# Patient Record
Sex: FEMALE | Race: BLACK OR AFRICAN AMERICAN | Employment: FULL TIME | URBAN - METROPOLITAN AREA
[De-identification: names, ages, dates, MRNs, and addresses within clinical notes are randomized per-mention and may not be internally consistent; named-entity substitution may affect disease eponyms.]

---

## 2018-01-29 ENCOUNTER — HOSPITAL ENCOUNTER (EMERGENCY)
Age: 43
Discharge: HOME | End: 2018-01-29
Attending: EMERGENCY MEDICINE | Admitting: EMERGENCY MEDICINE
Payer: COMMERCIAL

## 2018-01-29 ENCOUNTER — APPOINTMENT (OUTPATIENT)
Dept: ULTRASOUND IMAGING | Age: 43
End: 2018-01-29
Attending: EMERGENCY MEDICINE
Payer: COMMERCIAL

## 2018-01-29 ENCOUNTER — TELEPHONE (OUTPATIENT)
Dept: INTERNAL MEDICINE CLINIC | Age: 43
End: 2018-01-29

## 2018-01-29 VITALS
TEMPERATURE: 98.4 F | WEIGHT: 162.7 LBS | RESPIRATION RATE: 18 BRPM | HEIGHT: 67 IN | SYSTOLIC BLOOD PRESSURE: 104 MMHG | DIASTOLIC BLOOD PRESSURE: 78 MMHG | HEART RATE: 73 BPM | BODY MASS INDEX: 25.54 KG/M2 | OXYGEN SATURATION: 100 %

## 2018-01-29 DIAGNOSIS — N92.0 MENORRHAGIA WITH REGULAR CYCLE: Primary | ICD-10-CM

## 2018-01-29 DIAGNOSIS — R79.89 ELEVATED TSH: ICD-10-CM

## 2018-01-29 DIAGNOSIS — D25.9 UTERINE LEIOMYOMA, UNSPECIFIED LOCATION: ICD-10-CM

## 2018-01-29 LAB
ANION GAP SERPL CALC-SCNC: 7 MMOL/L (ref 5–15)
APPEARANCE UR: CLEAR
BACTERIA URNS QL MICRO: NEGATIVE /HPF
BASOPHILS # BLD: 0 K/UL (ref 0–0.1)
BASOPHILS NFR BLD: 1 % (ref 0–1)
BILIRUB UR QL: NEGATIVE
BUN SERPL-MCNC: 11 MG/DL (ref 6–20)
BUN/CREAT SERPL: 11 (ref 12–20)
CALCIUM SERPL-MCNC: 8.8 MG/DL (ref 8.5–10.1)
CHLORIDE SERPL-SCNC: 104 MMOL/L (ref 97–108)
CO2 SERPL-SCNC: 28 MMOL/L (ref 21–32)
COLOR UR: ABNORMAL
CREAT SERPL-MCNC: 0.98 MG/DL (ref 0.55–1.02)
DIFFERENTIAL METHOD BLD: ABNORMAL
EOSINOPHIL # BLD: 0.1 K/UL (ref 0–0.4)
EOSINOPHIL NFR BLD: 3 % (ref 0–7)
EPITH CASTS URNS QL MICRO: ABNORMAL /LPF
ERYTHROCYTE [DISTWIDTH] IN BLOOD BY AUTOMATED COUNT: 12.6 % (ref 11.5–14.5)
GLUCOSE SERPL-MCNC: 100 MG/DL (ref 65–100)
GLUCOSE UR STRIP.AUTO-MCNC: NEGATIVE MG/DL
HCG UR QL: NEGATIVE
HCT VFR BLD AUTO: 36.9 % (ref 35–47)
HGB BLD-MCNC: 12.7 G/DL (ref 11.5–16)
HGB UR QL STRIP: ABNORMAL
IMM GRANULOCYTES # BLD: 0 K/UL (ref 0–0.04)
IMM GRANULOCYTES NFR BLD AUTO: 0 % (ref 0–0.5)
KETONES UR QL STRIP.AUTO: NEGATIVE MG/DL
LEUKOCYTE ESTERASE UR QL STRIP.AUTO: NEGATIVE
LYMPHOCYTES # BLD: 1.3 K/UL (ref 0.8–3.5)
LYMPHOCYTES NFR BLD: 41 % (ref 12–49)
MCH RBC QN AUTO: 30.1 PG (ref 26–34)
MCHC RBC AUTO-ENTMCNC: 34.4 G/DL (ref 30–36.5)
MCV RBC AUTO: 87.4 FL (ref 80–99)
MONOCYTES # BLD: 0.3 K/UL (ref 0–1)
MONOCYTES NFR BLD: 9 % (ref 5–13)
NEUTS SEG # BLD: 1.5 K/UL (ref 1.8–8)
NEUTS SEG NFR BLD: 46 % (ref 32–75)
NITRITE UR QL STRIP.AUTO: NEGATIVE
NRBC # BLD: 0 K/UL (ref 0–0.01)
NRBC BLD-RTO: 0 PER 100 WBC
PH UR STRIP: 5 [PH] (ref 5–8)
PLATELET # BLD AUTO: 243 K/UL (ref 150–400)
PMV BLD AUTO: 9.9 FL (ref 8.9–12.9)
POTASSIUM SERPL-SCNC: 3.2 MMOL/L (ref 3.5–5.1)
PROT UR STRIP-MCNC: NEGATIVE MG/DL
RBC # BLD AUTO: 4.22 M/UL (ref 3.8–5.2)
RBC #/AREA URNS HPF: ABNORMAL /HPF (ref 0–5)
SODIUM SERPL-SCNC: 139 MMOL/L (ref 136–145)
SP GR UR REFRACTOMETRY: 1.01 (ref 1–1.03)
T4 FREE SERPL-MCNC: 0.8 NG/DL (ref 0.8–1.5)
TSH SERPL DL<=0.05 MIU/L-ACNC: 14.7 UIU/ML (ref 0.36–3.74)
UA: UC IF INDICATED,UAUC: ABNORMAL
UROBILINOGEN UR QL STRIP.AUTO: 0.2 EU/DL (ref 0.2–1)
WBC # BLD AUTO: 3.3 K/UL (ref 3.6–11)
WBC URNS QL MICRO: ABNORMAL /HPF (ref 0–4)

## 2018-01-29 PROCEDURE — 85025 COMPLETE CBC W/AUTO DIFF WBC: CPT | Performed by: EMERGENCY MEDICINE

## 2018-01-29 PROCEDURE — 84439 ASSAY OF FREE THYROXINE: CPT | Performed by: EMERGENCY MEDICINE

## 2018-01-29 PROCEDURE — 99284 EMERGENCY DEPT VISIT MOD MDM: CPT

## 2018-01-29 PROCEDURE — 81001 URINALYSIS AUTO W/SCOPE: CPT | Performed by: EMERGENCY MEDICINE

## 2018-01-29 PROCEDURE — 84443 ASSAY THYROID STIM HORMONE: CPT | Performed by: EMERGENCY MEDICINE

## 2018-01-29 PROCEDURE — 80048 BASIC METABOLIC PNL TOTAL CA: CPT | Performed by: EMERGENCY MEDICINE

## 2018-01-29 PROCEDURE — 76830 TRANSVAGINAL US NON-OB: CPT

## 2018-01-29 PROCEDURE — 81025 URINE PREGNANCY TEST: CPT | Performed by: EMERGENCY MEDICINE

## 2018-01-29 PROCEDURE — 36415 COLL VENOUS BLD VENIPUNCTURE: CPT | Performed by: EMERGENCY MEDICINE

## 2018-01-29 RX ORDER — MEDROXYPROGESTERONE ACETATE 10 MG/1
10 TABLET ORAL DAILY
Qty: 10 TAB | Refills: 0 | Status: SHIPPED | OUTPATIENT
Start: 2018-01-29 | End: 2018-09-20

## 2018-01-29 NOTE — ED NOTES
0830- Assumed care of patient. Patient is alert and oriented, does not appear to be in distress. Patient ambulatory to ED with c/o vaginal bleeding; hx of fibroids and multiple cysts. VSS.

## 2018-01-29 NOTE — ED PROVIDER NOTES
EMERGENCY DEPARTMENT HISTORY AND PHYSICAL EXAM      Date: 1/29/2018  Patient Name: Shiv Larson    History of Presenting Illness     Chief Complaint   Patient presents with    Vaginal Bleeding     Pt. complains of vaginal bleeding x2 weeks. Pt. 1 pad every 3 hours and today has an odor. History Provided By: Patient    HPI: Shiv Larson, 36 y.o. female with PMHx significant for HTN, anemia, asthma, thyroid disease, and HPV, presents ambulatory to the ED with cc of vaginal bleeding x ~2 weeks. She states she was going though 1 pad every ~2 hours with initial onset, but states her bleeding has since improved, though it had become slightly malodorous in the past few days. Pt states she has hx of irregular menstrual periods, but reports LMP approximately ~28 days ago. She notes hx of uterine fibroids, as well as hx of Bartholin's cyst removal. Pt denies currently taking anticoagulants. She denies currently being followed by OB/Gyn, noting her specialist recently changed location. Pt denies concern for STD exposure. Pt specifically denies fever, chills, cough, or ABD pain. PCP: Louise Garcia NP    There are no other complaints, changes, or physical findings at this time. Current Outpatient Prescriptions   Medication Sig Dispense Refill    HYDROcodone-acetaminophen (NORCO) 5-325 mg per tablet Take 1 Tab by mouth every four (4) hours as needed for Pain. Max Daily Amount: 6 Tabs. 12 Tab 0    ondansetron (ZOFRAN ODT) 4 mg disintegrating tablet Take 0.5-1 tablets by mouth every eight (8) hours as needed for Nausea. 20 tablet 1    L-Norgest&E Estradiol-E Estrad (LOSEASONIQUE) 0.10 mg-20 mcg (84)/10 mcg (7) 3MPk Take 1 Tab by mouth daily. 1 Package 3    multivitamin (ONE A DAY) tablet Take 1 Tab by mouth daily.          Past History     Past Medical History:  Past Medical History:   Diagnosis Date    Anemia NEC     Asthma     HPV in female     Hypertension     Thyroid disease        Past Surgical History:  Past Surgical History:   Procedure Laterality Date    HX GYN  2/18/14    HYSTEROSCOPY /DILATATION AND CURETTAGE, BARTHOLIN MARSUPILIZATION    HX TUBAL LIGATION         Family History:  Family History   Problem Relation Age of Onset    Diabetes Father     Stroke Father     High Cholesterol Father     Kidney Disease Father     Alcohol abuse Father     Anemia Father     Cancer Maternal Grandmother     Other Mother      clot to leg    SLE Brother     Asthma Son        Social History:  Social History   Substance Use Topics    Smoking status: Never Smoker    Smokeless tobacco: Not on file    Alcohol use No       Allergies: Allergies   Allergen Reactions    Shellfish Derived Hives         Review of Systems   Review of Systems   Constitutional: Negative for chills and fever. HENT: Negative for congestion and sore throat. Eyes: Negative for visual disturbance. Respiratory: Negative for cough and shortness of breath. Cardiovascular: Negative for chest pain and leg swelling. Gastrointestinal: Negative for abdominal pain, blood in stool, diarrhea and nausea. Endocrine: Negative for polyuria. Genitourinary: Positive for vaginal bleeding. Negative for dysuria, flank pain and vaginal discharge. Musculoskeletal: Negative for myalgias. Skin: Negative for rash. Allergic/Immunologic: Negative for immunocompromised state. Neurological: Negative for weakness and headaches. Psychiatric/Behavioral: Negative for confusion. Physical Exam   Physical Exam   Constitutional: She is oriented to person, place, and time. She appears well-developed and well-nourished. HENT:   Head: Normocephalic and atraumatic. Moist mucous membranes   Eyes: Conjunctivae are normal. Pupils are equal, round, and reactive to light. Right eye exhibits no discharge. Left eye exhibits no discharge. Neck: Normal range of motion. Neck supple. No tracheal deviation present.    Cardiovascular: Normal rate, regular rhythm and normal heart sounds. No murmur heard. Pulmonary/Chest: Effort normal and breath sounds normal. No respiratory distress. She has no wheezes. She has no rales. Abdominal: Soft. Bowel sounds are normal. There is no tenderness. There is no rebound and no guarding. Musculoskeletal: Normal range of motion. She exhibits no edema, tenderness or deformity. Neurological: She is alert and oriented to person, place, and time. Skin: Skin is warm and dry. No rash noted. No erythema. Psychiatric: Her behavior is normal.   Nursing note and vitals reviewed.         Diagnostic Study Results     Labs -     Recent Results (from the past 12 hour(s))   URINALYSIS W/ REFLEX CULTURE    Collection Time: 01/29/18  8:42 AM   Result Value Ref Range    Color YELLOW/STRAW      Appearance CLEAR CLEAR      Specific gravity 1.013 1.003 - 1.030      pH (UA) 5.0 5.0 - 8.0      Protein NEGATIVE  NEG mg/dL    Glucose NEGATIVE  NEG mg/dL    Ketone NEGATIVE  NEG mg/dL    Bilirubin NEGATIVE  NEG      Blood LARGE (A) NEG      Urobilinogen 0.2 0.2 - 1.0 EU/dL    Nitrites NEGATIVE  NEG      Leukocyte Esterase NEGATIVE  NEG      WBC 0-4 0 - 4 /hpf    RBC 10-20 0 - 5 /hpf    Epithelial cells FEW FEW /lpf    Bacteria NEGATIVE  NEG /hpf    UA:UC IF INDICATED CULTURE NOT INDICATED BY UA RESULT CNI     HCG URINE, QL    Collection Time: 01/29/18  8:42 AM   Result Value Ref Range    HCG urine, QL NEGATIVE  NEG     CBC WITH AUTOMATED DIFF    Collection Time: 01/29/18  8:42 AM   Result Value Ref Range    WBC 3.3 (L) 3.6 - 11.0 K/uL    RBC 4.22 3.80 - 5.20 M/uL    HGB 12.7 11.5 - 16.0 g/dL    HCT 36.9 35.0 - 47.0 %    MCV 87.4 80.0 - 99.0 FL    MCH 30.1 26.0 - 34.0 PG    MCHC 34.4 30.0 - 36.5 g/dL    RDW 12.6 11.5 - 14.5 %    PLATELET 725 749 - 599 K/uL    MPV 9.9 8.9 - 12.9 FL    NRBC 0.0 0  WBC    ABSOLUTE NRBC 0.00 0.00 - 0.01 K/uL    NEUTROPHILS 46 32 - 75 %    LYMPHOCYTES 41 12 - 49 %    MONOCYTES 9 5 - 13 %    EOSINOPHILS 3 0 - 7 %    BASOPHILS 1 0 - 1 %    IMMATURE GRANULOCYTES 0 0.0 - 0.5 %    ABS. NEUTROPHILS 1.5 (L) 1.8 - 8.0 K/UL    ABS. LYMPHOCYTES 1.3 0.8 - 3.5 K/UL    ABS. MONOCYTES 0.3 0.0 - 1.0 K/UL    ABS. EOSINOPHILS 0.1 0.0 - 0.4 K/UL    ABS. BASOPHILS 0.0 0.0 - 0.1 K/UL    ABS. IMM. GRANS. 0.0 0.00 - 0.04 K/UL    DF AUTOMATED     METABOLIC PANEL, BASIC    Collection Time: 01/29/18  8:42 AM   Result Value Ref Range    Sodium 139 136 - 145 mmol/L    Potassium 3.2 (L) 3.5 - 5.1 mmol/L    Chloride 104 97 - 108 mmol/L    CO2 28 21 - 32 mmol/L    Anion gap 7 5 - 15 mmol/L    Glucose 100 65 - 100 mg/dL    BUN 11 6 - 20 MG/DL    Creatinine 0.98 0.55 - 1.02 MG/DL    BUN/Creatinine ratio 11 (L) 12 - 20      GFR est AA >60 >60 ml/min/1.73m2    GFR est non-AA >60 >60 ml/min/1.73m2    Calcium 8.8 8.5 - 10.1 MG/DL   TSH 3RD GENERATION    Collection Time: 01/29/18  8:42 AM   Result Value Ref Range    TSH 14.70 (H) 0.36 - 3.74 uIU/mL       Radiologic Studies -   US TRANSVAGINAL   Final Result   CLINICAL HISTORY: Abnormal vaginal bleeding     Realtime sonographic imaging of the pelvis was performed transvaginally. The  uterus measures 9.6 x 5.4 x 6.1 cm and has multiple fibroids. There is a 2.3 cm  fibroid in the posterior uterine body and a 2.3 cm fibroid in the anterior  uterine body. There is suggestion of adenomyosis. The endometrial stripe  measures 7 mm. The right ovary measures 3.4 x 2.8 x 2.3 cm and the left ovary  measures 2.8 x 2.7 x 1.3 cm. Small follicular cysts are seen in both ovaries,  the largest of which measures 1.3 cm. Blood flow is documented within both  ovaries. No free fluid.      IMPRESSION  IMPRESSION: Multiple fibroids. Suggestion of adenomyosis. Medical Decision Making   I am the first provider for this patient. I reviewed the vital signs, available nursing notes, past medical history, past surgical history, family history and social history. Vital Signs-Reviewed the patient's vital signs.   Patient Vitals for the past 12 hrs:   Temp Pulse Resp BP SpO2   01/29/18 0802 98.4 °F (36.9 °C) 89 14 (!) 136/92 100 %       Pulse Oximetry Analysis - 100% on RA    Records Reviewed: Nursing Notes and Old Medical Records    Provider Notes (Medical Decision Making):     DDx: dysfunctional uterine bleeding, hypothyroidism, fibroids, mass, malignancy    ED Course:   Initial assessment performed. The patients presenting problems have been discussed, and they are in agreement with the care plan formulated and outlined with them. I have encouraged them to ask questions as they arise throughout their visit. Consult Note:  11:03 AM  Toi Mullins DO spoke with Amelia Salazar MD  Specialty: OB/Gyn  Discussed pts hx, disposition, and available diagnostic and imaging results. Reviewed care plans. Consultant agrees with plans as outlined. He recommends Provera or Sprintec. Consult Note:  11:21 AM  Toi Mullins DO spoke with Gretta Estevez NP  Specialty: PCP  Discussed pts hx, disposition, and available diagnostic and imaging results. Reviewed care plans. Consultant agrees with plans as outlined. She will see pt in office. Follow up for possible hypothyroidism. Disposition:  DISCHARGE NOTE:  11:29 AM  The patient is ready for discharge. The patients signs, symptoms, diagnosis, and instructions for discharge have been discussed and the pt has conveyed their understanding. The patient is to follow up as recommended or return to the ER should their symptoms worsen. Plan has been discussed and patient has conveyed their agreement. PLAN:  1. Current Discharge Medication List      START taking these medications    Details   medroxyPROGESTERone (PROVERA) 10 mg tablet Take 1 Tab by mouth daily. Qty: 10 Tab, Refills: 0           2.    Follow-up Information     Follow up With Details Comments Contact Info    Gretta Estevez NP Call in 1 day  [address] and Internal Medicine  [facility] [address] [facility]  [phone]      [facility] EMERGENCY DEPT   [address] [place] [address] Road  [phone] Tariq Ray . [phone]          Return to ED if worse     Diagnosis     Clinical Impression:   1. Menorrhagia with regular cycle    2. Uterine leiomyoma, unspecified location    3. Elevated TSH        Attestations: This note is prepared by Erwin Aragon, acting as Scribe for [employer] DO. [employer], DO: The scribe's documentation has been prepared under my direction and personally reviewed by me in its entirety. I confirm that the note above accurately reflects all work, treatment, procedures, and medical decision making performed by me.

## 2018-01-29 NOTE — DISCHARGE INSTRUCTIONS
Abnormal Uterine Bleeding: Care Instructions  Your Care Instructions    Abnormal uterine bleeding (AUB) is irregular bleeding from the uterus that is longer or heavier than usual or does not occur at your regular time. Sometimes it is caused by changes in hormone levels. It can also be caused by growths in the uterus, such as fibroids or polyps. Sometimes a cause cannot be found. You may have heavy bleeding when you are not expecting your period. Your doctor may suggest a pregnancy test, if you think you are pregnant. Follow-up care is a key part of your treatment and safety. Be sure to make and go to all appointments, and call your doctor if you are having problems. It's also a good idea to know your test results and keep a list of the medicines you take. How can you care for yourself at home? · Be safe with medicines. Take pain medicines exactly as directed. ¨ If the doctor gave you a prescription medicine for pain, take it as prescribed. ¨ If you are not taking a prescription pain medicine, ask your doctor if you can take an over-the-counter medicine. · You may be low in iron because of blood loss. Eat a balanced diet that is high in iron and vitamin C. Foods rich in iron include red meat, shellfish, eggs, beans, and leafy green vegetables. Talk to your doctor about whether you need to take iron pills or a multivitamin. When should you call for help? Call 911 anytime you think you may need emergency care. For example, call if:  ? · You passed out (lost consciousness). ?Call your doctor now or seek immediate medical care if:  ? · You have new or worse belly or pelvic pain. ? · You have severe vaginal bleeding. ? · You feel dizzy or lightheaded, or you feel like you may faint. ? Watch closely for changes in your health, and be sure to contact your doctor if:  ? · You think you may be pregnant. ? · Your bleeding gets worse. ? · You do not get better as expected.    Where can you learn more?  Go to http://mercedes-jl.info/. Enter X457 in the search box to learn more about \"Abnormal Uterine Bleeding: Care Instructions. \"  Current as of: October 13, 2016  Content Version: 11.4  © 7682-0389 [employer]. Care instructions adapted under license by [employer] (which disclaims liability or warranty for this information). If you have questions about a medical condition or this instruction, always ask your healthcare professional. [address] any warranty or liability for your use of this information. Uterine Fibroids: Care Instructions  Your Care Instructions    Uterine fibroids are growths in the uterus. Fibroids aren't cancer. Doctors don't know what causes fibroids. Fibroids are very common in women during their childbearing years. Fibroids can grow on the inside of the uterus, in the muscle wall of the uterus, or near the outside wall of the uterus. In some women, fibroids cause painful cramps and heavy periods. In these cases, taking anti-inflammatory medicines, birth control pills, or using an intrauterine device (IUD) often helps decrease symptoms. Sometimes surgery is needed to treat fibroids. But if you are near menopause, you may want to wait and see if your symptoms get better. Most fibroids shrink and go away after menopause, when your menstrual periods stop completely. Follow-up care is a key part of your treatment and safety. Be sure to make and go to all appointments, and call your doctor if you are having problems. It's also a good idea to know your test results and keep a list of the medicines you take. How can you care for yourself at home? · If your doctor gave you medicine, take it as exactly as prescribed. Be safe with medicines. Call your doctor if you think you are having a problem with your medicine. · Take anti-inflammatory medicines for pain.  These include ibuprofen (Advil, Motrin) and naproxen (Ishan). Read and follow all instructions on the label. · Use heat, such as a hot water bottle or a heating pad set on low, or a warm bath to relax tense muscles and relieve cramping. Put a thin cloth between the heating pad and your skin. Never go to sleep with a heating pad on. · Lie down and put a pillow under your knees. Or, lie on your side and bring your knees up to your chest. These positions may help relieve belly pain or pressure. · Keep track of how many sanitary pads or tampons you use each day. · Get at least 30 minutes of exercise on most days of the week. Walking is a good choice. You also may want to do other activities, such as running, swimming, cycling, or playing tennis or team sports. · If you bleed longer than usual or have heavy bleeding, take a daily multivitamin with iron. When should you call for help? Call your doctor now or seek immediate medical care if:  ? · You have severe vaginal bleeding. ? · You have new or worse belly or pelvic pain. ? Watch closely for changes in your health, and be sure to contact your doctor if:  ? · You have unusual vaginal bleeding. ? · You do not get better as expected. Where can you learn more? Go to http://mercedes-jl.info/. Enter B121 in the search box to learn more about \"Uterine Fibroids: Care Instructions. \"  Current as of: October 13, 2016  Content Version: 11.4  © 3138-0479 [employer]. Care instructions adapted under license by [employer] (which disclaims liability or warranty for this information). If you have questions about a medical condition or this instruction, always ask your healthcare professional. [place] [address] any warranty or liability for your use of this information.

## 2018-01-30 ENCOUNTER — OFFICE VISIT (OUTPATIENT)
Dept: INTERNAL MEDICINE CLINIC | Age: 43
End: 2018-01-30

## 2018-01-30 VITALS
SYSTOLIC BLOOD PRESSURE: 114 MMHG | WEIGHT: 162.4 LBS | TEMPERATURE: 98.6 F | RESPIRATION RATE: 18 BRPM | BODY MASS INDEX: 25.49 KG/M2 | HEIGHT: 67 IN | OXYGEN SATURATION: 100 % | HEART RATE: 64 BPM | DIASTOLIC BLOOD PRESSURE: 63 MMHG

## 2018-01-30 DIAGNOSIS — E87.6 HYPOKALEMIA: ICD-10-CM

## 2018-01-30 DIAGNOSIS — E89.0 POSTABLATIVE HYPOTHYROIDISM: Primary | ICD-10-CM

## 2018-01-30 DIAGNOSIS — N92.1 MENORRHAGIA WITH IRREGULAR CYCLE: ICD-10-CM

## 2018-01-30 RX ORDER — DIAPER,BRIEF,INFANT-TODD,DISP
EACH MISCELLANEOUS
COMMUNITY
Start: 2018-01-30 | End: 2018-09-20

## 2018-01-30 RX ORDER — POTASSIUM CHLORIDE 750 MG/1
10 TABLET, EXTENDED RELEASE ORAL 2 TIMES DAILY
Qty: 10 TAB | Refills: 0 | Status: SHIPPED | OUTPATIENT
Start: 2018-01-30 | End: 2018-02-04

## 2018-01-30 RX ORDER — LEVOTHYROXINE SODIUM 50 UG/1
50 TABLET ORAL
Qty: 30 TAB | Refills: 3 | Status: SHIPPED | OUTPATIENT
Start: 2018-01-30 | End: 2018-09-24 | Stop reason: DRUGHIGH

## 2018-01-30 NOTE — PROGRESS NOTES
HISTORY OF PRESENT ILLNESS  Katarina Montenegro is a 36 y.o. female presents for [facility]     Patient seen by ED provider 1/29 with heavy vaginal bleed for 2 weeks. Started on [address]. Bleeding significantly improved first day on medication  Ultrasound showed multiple uterine fibroid. She will schedule appointment with gynecologist at [facility]. Hx of menorrhagia, hysteroscopy and D&C  TSH elevated on ED visit. She reports hx of thyroid disorder complicated by thyroid storm and graves disease. No medical management for sometime with normal thyroid function. Past Medical History:   Diagnosis Date    Anemia NEC     Asthma     HPV in female     Hypertension     Thyroid disease      Past Surgical History:   Procedure Laterality Date    HX GYN  2/18/14    HYSTEROSCOPY /DILATATION AND CURETTAGE, BARTHOLIN MARSUPILIZATION    HX TUBAL LIGATION           Review of Systems   Constitutional: Positive for malaise/fatigue. Eyes: Negative. Respiratory: Negative. Cardiovascular: Negative. Genitourinary: Negative. Neurological: Negative for dizziness and headaches. Psychiatric/Behavioral: Negative. Physical Exam    ASSESSMENT and PLAN    ICD-10-CM ICD-9-CM    1. Postablative hypothyroidism E89.0 244.1 levothyroxine (SYNTHROID) 50 mcg tablet   2. Menorrhagia with irregular cycle N92.1 626.2    3. Hypokalemia E87.6 276.8 MAGNESIUM      potassium chloride (KLOR-CON) 10 mEq tablet      POTASSIUM     Follow-up Disposition:  Return in about 2 months (around 3/30/2018) for thyroid disorder. lab results and schedule of future lab studies reviewed with patient  reviewed medications and side effects in detail    Restart medication. 2. Strongly encouraged to complete ED medication, schedule appointment with gynecologist     3. Etiology unknown.  Encouraged  to increase dietary potassium    Patient voices understanding and acceptance of this advice and will call back if any further questions or concerns. An After Visit Summary was printed and given to the patient.

## 2018-01-30 NOTE — MR AVS SNAPSHOT
[address]  [address] [place] [address] 
[phone] Patient: Sabino Alonso MRN: [id number] Visit Information Date & Time Provider Department Dept. Phone Encounter #  
 1/30/2018  1:45 PM Kendra Wills [phone] [phone] [id number] Follow-up Instructions Return in about 2 months (around 3/30/2018) for thyroid disorder. Upcoming Health Maintenance Date Due DTaP/Tdap/Td series (1 - Tdap) 8/7/2014 PAP AKA CERVICAL CYTOLOGY 5/7/2017 Allergies as of 1/30/2018  Review Complete On: 1/30/2018 By: Daniella Bartlett NP Severity Noted Reaction Type Reactions Shellfish Derived  08/06/2014    Hives Current Immunizations  Reviewed on 8/3/2015 Name Date Influenza Vaccine (Quad) PF 1/5/2015 Td, Adsorbed PF 8/6/2014  7:33 AM  
  
 Not reviewed this visit You Were Diagnosed With   
  
 Codes Comments Postablative hypothyroidism    -  Primary ICD-10-CM: E89.0 ICD-9-CM: 244.1 Menorrhagia with irregular cycle     ICD-10-CM: N92.1 ICD-9-CM: 626.2 Hypokalemia     ICD-10-CM: E87.6 ICD-9-CM: 276.8 Vitals BP Pulse Temp Resp Height(growth percentile) Weight(growth percentile) 114/63 (BP 1 Location: Right arm, BP Patient Position: Sitting) 64 98.6 °F (37 °C) (Oral) 18 5' 7.01\" (1.702 m) 162 lb 6.4 oz (73.7 kg) LMP SpO2 BMI OB Status Smoking Status 01/15/2018 100% 25.43 kg/m2 Chemically Induced Never Smoker BMI and BSA Data Body Mass Index Body Surface Area  
 25.43 kg/m 2 1.87 m 2 Preferred Pharmacy Pharmacy Name Phone [facility] [address] [facility] [phone] Your Updated Medication List  
  
   
This list is accurate as of: 1/30/18  2:50 PM.  Always use your most recent med list.  
  
  
  
  
 biotin 10,000 mcg Cap Take  by mouth.  
  
 levothyroxine 50 mcg tablet Commonly known as:  SYNTHROID Take 1 Tab by mouth Daily (before breakfast). Indications: hypothyroidism  
  
 medroxyPROGESTERone 10 mg tablet Commonly known as:  PROVERA Take 1 Tab by mouth daily. multivitamin tablet Commonly known as:  ONE A DAY Take 1 Tab by mouth daily. potassium chloride 10 mEq tablet Commonly known as:  KLOR-CON Take 1 Tab by mouth two (2) times a day for 5 days. Prescriptions Sent to Pharmacy Refills  
 levothyroxine (SYNTHROID) 50 mcg tablet 3 Sig: Take 1 Tab by mouth Daily (before breakfast). Indications: hypothyroidism Class: Normal  
 Pharmacy: [address] [place] [address] Ph #: [phone] Route: Oral  
 potassium chloride (KLOR-CON) 10 mEq tablet 0 Sig: Take 1 Tab by mouth two (2) times a day for 5 days. Class: Normal  
 Pharmacy: [address] [place] [address] Ph #: [phone] Route: Oral  
  
We Performed the Following MAGNESIUM B480822 CPT(R)] POTASSIUM [id number] CPT(R)] Follow-up Instructions Return in about 2 months (around 3/30/2018) for thyroid disorder. Patient Instructions Hypokalemia: Care Instructions Your Care Instructions Hypokalemia (say \"kp-dw-yhp-KAYLEN-inocencia-uh\") is a low level of potassium. The heart, muscles, kidneys, and nervous system all need potassium to work well. This problem has many different causes. Kidney problems, diet, and medicines like diuretics and laxatives can cause it. So can vomiting or diarrhea. In some cases, cancer is the cause. Your doctor may do tests to find the cause of your low potassium levels. You may need medicines to bring your potassium levels back to normal. You may also need regular blood tests to check your potassium. If you have very low potassium, you may need intravenous (IV) medicines. You also may need tests to check the electrical activity of your heart. Heart problems caused by low potassium levels can be very serious. Follow-up care is a key part of your treatment and safety. Be sure to make and go to all appointments, and call your doctor if you are having problems. It's also a good idea to know your test results and keep a list of the medicines you take. How can you care for yourself at home? · If your doctor recommends it, eat foods that have a lot of potassium. These include fresh fruits, juices, and vegetables. They also include nuts, beans, and milk. · Be safe with medicines. If your doctor prescribes medicines or potassium supplements, take them exactly as directed. Call your doctor if you have any problems with your medicines. · Get your potassium levels tested as often as your doctor tells you. When should you call for help? Call 911 anytime you think you may need emergency care. For example, call if: 
? · You feel like your heart is missing beats. Heart problems caused by low potassium can cause death. ? · You passed out (lost consciousness). ? · You have a seizure. ?Call your doctor now or seek immediate medical care if: 
? · You feel weak or unusually tired. ? · You have severe arm or leg cramps. ? · You have tingling or numbness. ? · You feel sick to your stomach, or you vomit. ? · You have belly cramps. ? · You feel bloated or constipated. ? · You have to urinate a lot. ? · You feel very thirsty most of the time. ? · You are dizzy or lightheaded, or you feel like you may faint. ? · You feel depressed, or you lose touch with reality. ? Watch closely for changes in your health, and be sure to contact your doctor if: 
? · You do not get better as expected. Where can you learn more? Go to http://mercedes-jl.info/. Enter G358 in the search box to learn more about \"Hypokalemia: Care Instructions. \" Current as of: May 12, 2017 Content Version: 11.4 © 5144-9122 [employer] Care instructions adapted under license by [employer] (which disclaims liability or warranty for this information). If you have questions about a medical condition or this instruction, always ask your healthcare professional. [address] any warranty or liability for your use of this information. Hypothyroidism: Care Instructions Your Care Instructions You have hypothyroidism, which means that your body is not making enough thyroid hormone. This hormone helps your body use energy. If your thyroid level is low, you may feel tired, be constipated, have an increase in your blood pressure, or have dry skin or memory problems. You may also get cold easily, even when it is warm. Women with low thyroid levels may have heavy menstrual periods. A blood test to find your thyroid-stimulating hormone (TSH) level is used to check for hypothyroidism. A high TSH level may mean that you have low thyroid. When your body is not making enough thyroid hormone, TSH levels rise in an effort to make the body produce more. The treatment for hypothyroidism is to take thyroid hormone pills. You should start to feel better in 1 to 2 weeks. But it can take several months to see changes in the TSH level. You will need regular visits with your doctor to make sure you have the right dose of medicine. Most people need treatment for the rest of their lives. You will need to see your doctor regularly to have blood tests and to make sure you are doing well. Follow-up care is a key part of your treatment and safety. Be sure to make and go to all appointments, and call your doctor if you are having problems. It's also a good idea to know your test results and keep a list of the medicines you take. How can you care for yourself at home? · Take your thyroid hormone medicine exactly as prescribed.  Call your doctor if you think you are having a problem with your medicine. Most people do not have side effects if they take the right amount of medicine regularly. ¨ Take the medicine 30 minutes before breakfast, and do not take it with calcium, vitamins, or iron. ¨ Do not take extra doses of your thyroid medicine. It will not help you get better any faster, and it may cause side effects. ¨ If you forget to take a dose, do NOT take a double dose of medicine. Take your usual dose the next day. · Tell your doctor about all prescription, herbal, or over-the-counter products you take. · Take care of yourself. Eat a healthy diet, get enough sleep, and get regular exercise. When should you call for help? Call 911 anytime you think you may need emergency care. For example, call if: 
? · You passed out (lost consciousness). ? · You have severe trouble breathing. ? · You have a very slow heartbeat (less than 60 beats a minute). ? · You have a low body temperature (95°F or below). ?Call your doctor now or seek immediate medical care if: 
? · You feel tired, sluggish, or weak. ? · You have trouble remembering things or concentrating. ? · You do not begin to feel better 2 weeks after starting your medicine. ? Watch closely for changes in your health, and be sure to contact your doctor if you have any problems. Where can you learn more? Go to http://mercedes-jl.info/. Enter E456 in the search box to learn more about \"Hypothyroidism: Care Instructions. \" Current as of: May 12, 2017 Content Version: 11.4 © 4760-4010 [employer]. Care instructions adapted under license by [employer] (which disclaims liability or warranty for this information). If you have questions about a medical condition or this instruction, always ask your healthcare professional. [address] any warranty or liability for your use of this information. Introducing [facility]! [facility] introduces Ph.Creative patient portal. Now you can access parts of your medical record, email your doctor's office, and request medication refills online. 1. In your internet browser, go to [email or web address] 2. Click on the First Time User? Click Here link in the Sign In box. You will see the New Member Sign Up page. 3. Enter your [employer] Access Code exactly as it appears below. You will not need to use this code after youve completed the sign-up process. If you do not sign up before the expiration date, you must request a new code. · Ph.Creative Access Code: [facility] Expires: 4/29/2018 11:30 AM 
 
4. Enter the last four digits of your Social Security Number (xxxx) and Date of Birth (mm/dd/yyyy) as indicated and click Submit. You will be taken to the next sign-up page. 5. Create a Ph.Creative ID. This will be your Ph.Creative login ID and cannot be changed, so think of one that is secure and easy to remember. 6. Create a Ph.Creative password. You can change your password at any time. 7. Enter your Password Reset Question and Answer. This can be used at a later time if you forget your password. 8. Enter your e-mail address. You will receive e-mail notification when new information is available in [address] 9. Click Sign Up. You can now view and download portions of your medical record. 10. Click the Download Summary menu link to download a portable copy of your medical information. If you have questions, please visit the Frequently Asked Questions section of the Ph.Creative website. Remember, Ph.Creative is NOT to be used for urgent needs. For medical emergencies, dial 911. Now available from your iPhone and Android! Please provide this summary of care documentation to your next provider. Your primary care clinician is listed as Bettey Lanes. If you have any questions after today's visit, please call [phone].

## 2018-01-30 NOTE — PATIENT INSTRUCTIONS
Hypokalemia: Care Instructions  Your Care Instructions    Hypokalemia (say \"fn-lb-ntm-KAYLEN-inocencia-uh\") is a low level of potassium. The heart, muscles, kidneys, and nervous system all need potassium to work well. This problem has many different causes. Kidney problems, diet, and medicines like diuretics and laxatives can cause it. So can vomiting or diarrhea. In some cases, cancer is the cause. Your doctor may do tests to find the cause of your low potassium levels. You may need medicines to bring your potassium levels back to normal. You may also need regular blood tests to check your potassium. If you have very low potassium, you may need intravenous (IV) medicines. You also may need tests to check the electrical activity of your heart. Heart problems caused by low potassium levels can be very serious. Follow-up care is a key part of your treatment and safety. Be sure to make and go to all appointments, and call your doctor if you are having problems. It's also a good idea to know your test results and keep a list of the medicines you take. How can you care for yourself at home? · If your doctor recommends it, eat foods that have a lot of potassium. These include fresh fruits, juices, and vegetables. They also include nuts, beans, and milk. · Be safe with medicines. If your doctor prescribes medicines or potassium supplements, take them exactly as directed. Call your doctor if you have any problems with your medicines. · Get your potassium levels tested as often as your doctor tells you. When should you call for help? Call 911 anytime you think you may need emergency care. For example, call if:  ? · You feel like your heart is missing beats. Heart problems caused by low potassium can cause death. ? · You passed out (lost consciousness). ? · You have a seizure. ?Call your doctor now or seek immediate medical care if:  ? · You feel weak or unusually tired. ? · You have severe arm or leg cramps. ? · You have tingling or numbness. ? · You feel sick to your stomach, or you vomit. ? · You have belly cramps. ? · You feel bloated or constipated. ? · You have to urinate a lot. ? · You feel very thirsty most of the time. ? · You are dizzy or lightheaded, or you feel like you may faint. ? · You feel depressed, or you lose touch with reality. ? Watch closely for changes in your health, and be sure to contact your doctor if:  ? · You do not get better as expected. Where can you learn more? Go to http://mercedes-jl.info/. Enter G358 in the search box to learn more about \"Hypokalemia: Care Instructions. \"  Current as of: May 12, 2017  Content Version: 11.4  © 4987-9502 [employer]. Care instructions adapted under license by [employer] (which disclaims liability or warranty for this information). If you have questions about a medical condition or this instruction, always ask your healthcare professional. [address] any warranty or liability for your use of this information. Hypothyroidism: Care Instructions  Your Care Instructions    You have hypothyroidism, which means that your body is not making enough thyroid hormone. This hormone helps your body use energy. If your thyroid level is low, you may feel tired, be constipated, have an increase in your blood pressure, or have dry skin or memory problems. You may also get cold easily, even when it is warm. Women with low thyroid levels may have heavy menstrual periods. A blood test to find your thyroid-stimulating hormone (TSH) level is used to check for hypothyroidism. A high TSH level may mean that you have low thyroid. When your body is not making enough thyroid hormone, TSH levels rise in an effort to make the body produce more. The treatment for hypothyroidism is to take thyroid hormone pills. You should start to feel better in 1 to 2 weeks.  But it can take several months to see changes in the TSH level. You will need regular visits with your doctor to make sure you have the right dose of medicine. Most people need treatment for the rest of their lives. You will need to see your doctor regularly to have blood tests and to make sure you are doing well. Follow-up care is a key part of your treatment and safety. Be sure to make and go to all appointments, and call your doctor if you are having problems. It's also a good idea to know your test results and keep a list of the medicines you take. How can you care for yourself at home? · Take your thyroid hormone medicine exactly as prescribed. Call your doctor if you think you are having a problem with your medicine. Most people do not have side effects if they take the right amount of medicine regularly. ¨ Take the medicine 30 minutes before breakfast, and do not take it with calcium, vitamins, or iron. ¨ Do not take extra doses of your thyroid medicine. It will not help you get better any faster, and it may cause side effects. ¨ If you forget to take a dose, do NOT take a double dose of medicine. Take your usual dose the next day. · Tell your doctor about all prescription, herbal, or over-the-counter products you take. · Take care of yourself. Eat a healthy diet, get enough sleep, and get regular exercise. When should you call for help? Call 911 anytime you think you may need emergency care. For example, call if:  ? · You passed out (lost consciousness). ? · You have severe trouble breathing. ? · You have a very slow heartbeat (less than 60 beats a minute). ? · You have a low body temperature (95°F or below). ?Call your doctor now or seek immediate medical care if:  ? · You feel tired, sluggish, or weak. ? · You have trouble remembering things or concentrating. ? · You do not begin to feel better 2 weeks after starting your medicine. ? Watch closely for changes in your health, and be sure to contact your doctor if you have any problems. Where can you learn more? Go to http://mercedes-jl.info/. Enter E138 in the search box to learn more about \"Hypothyroidism: Care Instructions. \"  Current as of: May 12, 2017  Content Version: 11.4  © 3750-0084 Healthwise, [employer]. Care instructions adapted under license by [employer] (which disclaims liability or warranty for this information). If you have questions about a medical condition or this instruction, always ask your healthcare professional. [address] any warranty or liability for your use of this information.

## 2018-01-30 NOTE — PROGRESS NOTES
RM#9  Chief Complaint   Patient presents with   [facility] Follow Up     f/u [id number] from yesterday for vaginal bleeding     1. Have you been to the ER, urgent care clinic since your last visit? Hospitalized since your last visit? Yes    2. Have you seen or consulted any other health care providers outside of the [address] [place] [address] since your last visit? Include any pap smears or colon screening.  No, [facility] ER on 1/29/18 for vaginal bleeding  Health Maintenance Due   Topic Date Due    DTaP/Tdap/Td series (1 - Tdap) 08/07/2014    PAP AKA CERVICAL CYTOLOGY  05/07/2017    Influenza Age 9 to Adult  08/01/2017

## 2018-01-31 ENCOUNTER — TELEPHONE (OUTPATIENT)
Dept: INTERNAL MEDICINE CLINIC | Age: 43
End: 2018-01-31

## 2018-01-31 LAB
MAGNESIUM SERPL-MCNC: 2.2 MG/DL (ref 1.6–2.3)
POTASSIUM SERPL-SCNC: 3.9 MMOL/L (ref 3.5–5.2)

## 2018-01-31 NOTE — TELEPHONE ENCOUNTER
Spoke with patient after verifying name and  regarding Racquel Hannah's recommendations. Writer informed patient of Zulema Hannah's recommendations. Patient given an opportunity to ask questions, repeated information, and verbalized understanding.

## 2018-09-20 ENCOUNTER — OFFICE VISIT (OUTPATIENT)
Dept: INTERNAL MEDICINE CLINIC | Age: 43
End: 2018-09-20

## 2018-09-20 VITALS
TEMPERATURE: 98.6 F | WEIGHT: 158.8 LBS | HEART RATE: 79 BPM | HEIGHT: 67 IN | DIASTOLIC BLOOD PRESSURE: 90 MMHG | BODY MASS INDEX: 24.92 KG/M2 | OXYGEN SATURATION: 99 % | RESPIRATION RATE: 18 BRPM | SYSTOLIC BLOOD PRESSURE: 130 MMHG

## 2018-09-20 DIAGNOSIS — E78.00 PURE HYPERCHOLESTEROLEMIA: ICD-10-CM

## 2018-09-20 DIAGNOSIS — Z23 ENCOUNTER FOR IMMUNIZATION: ICD-10-CM

## 2018-09-20 DIAGNOSIS — N92.1 MENORRHAGIA WITH IRREGULAR CYCLE: ICD-10-CM

## 2018-09-20 DIAGNOSIS — E89.0 POSTABLATIVE HYPOTHYROIDISM: Primary | ICD-10-CM

## 2018-09-20 DIAGNOSIS — J40 BRONCHITIS: ICD-10-CM

## 2018-09-20 DIAGNOSIS — E87.6 HYPOKALEMIA: ICD-10-CM

## 2018-09-20 DIAGNOSIS — J06.9 UPPER RESPIRATORY TRACT INFECTION, UNSPECIFIED TYPE: ICD-10-CM

## 2018-09-20 DIAGNOSIS — Z12.31 ENCOUNTER FOR SCREENING MAMMOGRAM FOR MALIGNANT NEOPLASM OF BREAST: ICD-10-CM

## 2018-09-20 LAB
BILIRUB UR QL STRIP: NEGATIVE
GLUCOSE UR-MCNC: NEGATIVE MG/DL
KETONES P FAST UR STRIP-MCNC: NEGATIVE MG/DL
PH UR STRIP: 5.5 [PH] (ref 4.6–8)
PROT UR QL STRIP: NEGATIVE
SP GR UR STRIP: 1.02 (ref 1–1.03)
UA UROBILINOGEN AMB POC: NORMAL (ref 0.2–1)
URINALYSIS CLARITY POC: CLEAR
URINALYSIS COLOR POC: YELLOW
URINE BLOOD POC: NORMAL
URINE LEUKOCYTES POC: NEGATIVE
URINE NITRITES POC: NEGATIVE

## 2018-09-20 RX ORDER — AZITHROMYCIN 250 MG/1
TABLET, FILM COATED ORAL
Qty: 6 TAB | Refills: 0 | Status: SHIPPED | OUTPATIENT
Start: 2018-09-20 | End: 2018-09-25

## 2018-09-20 NOTE — PROGRESS NOTES
Exam Room [facility] Andrew Gonzalez is a 39 y.o. female    Chief Complaint   Patient presents with    Thyroid Problem    Cold Symptoms     nasal congestion, cough    Dizziness     On Excertion     1. Have you been to the ER, urgent care clinic since your last visit? Hospitalized since your last visit? No    2. Have you seen or consulted any other health care providers outside of the [address] [place] [address] since your last visit? Include any pap smears or colon screening. No     Health Maintenance Due   Topic Date Due    DTaP/Tdap/Td series (1 - Tdap) 08/07/2014    PAP AKA CERVICAL CYTOLOGY  05/07/2017    Influenza Age 9 to Adult  08/01/2018       Pap over due 5/7/17    Patient would like flu vaccine.

## 2018-09-20 NOTE — PROGRESS NOTES
HISTORY OF PRESENT ILLNESS  Giorgio Mackey is a 39 y.o. female with history of hypothyroidism presents for routine visit  HPI  Two weeks, chest, cough, discolored sputum . No fever    Mucinex, Robitussin ineffective    Works in hospital    Cycles still heavy, irregular, with large clots, no longer has dysmenorrhea. LNMP August 30    Known uterine fibroid    She would like a hysterectomy    Gyn provider, Dr. Latia York, last seen 2014. Unsure if provider still in [place] to sometimes missing thyroid medication       Past Medical History:   Diagnosis Date    Anemia NEC     Asthma     HPV in female     Hypertension     Thyroid disease        Current Outpatient Prescriptions on File Prior to Visit   Medication Sig Dispense Refill    levothyroxine (SYNTHROID) 50 mcg tablet Take 1 Tab by mouth Daily (before breakfast). Indications: hypothyroidism 30 Tab 3     No current facility-administered medications on file prior to visit. Review of Systems   Constitutional: Positive for malaise/fatigue. Negative for chills, diaphoresis and fever. HENT: Positive for congestion. Negative for ear discharge, ear pain, hearing loss, nosebleeds, sinus pain, sore throat and tinnitus. Respiratory: Positive for cough and sputum production. Negative for hemoptysis, shortness of breath and wheezing. Cardiovascular: Negative. Negative for chest pain, palpitations and leg swelling. Gastrointestinal: Negative. Genitourinary: Negative. Musculoskeletal: Negative. Skin: Negative. Neurological: Negative for dizziness and headaches. Psychiatric/Behavioral: Negative.       Visit Vitals    /90 (BP 1 Location: Left arm, BP Patient Position: Sitting)    Pulse 79    Temp 98.6 °F (37 °C) (Oral)    Resp 18    Ht 5' 7.01\" (1.702 m)    Wt 158 lb 12.8 oz (72 kg)    LMP 08/30/2018 (Approximate)    SpO2 99%    BMI 24.86 kg/m2       Physical Exam   Constitutional: She is oriented to person, place, and time. She appears well-developed and well-nourished. No distress. HENT:   Head: Normocephalic and atraumatic. Left Ear: External ear normal.   Nose: Mucosal edema and rhinorrhea present. Right sinus exhibits maxillary sinus tenderness. Left sinus exhibits maxillary sinus tenderness. Mouth/Throat: Oropharynx is clear and moist. No oropharyngeal exudate. Small amount dark blood, floor right auditory canal.  TMs with good LM and LR   Eyes: Conjunctivae are normal. Right eye exhibits no discharge. Neck: Normal range of motion. Neck supple. Cardiovascular: Normal rate and regular rhythm. Exam reveals no gallop and no friction rub. No murmur heard. Pulmonary/Chest: Effort normal and breath sounds normal. No respiratory distress. She has no wheezes. She has no rales. She exhibits no tenderness. Musculoskeletal: She exhibits no edema, tenderness or deformity. Lymphadenopathy:     She has no cervical adenopathy. Neurological: She is alert and oriented to person, place, and time. No cranial nerve deficit. Skin: Skin is warm and dry. No rash noted. She is not diaphoretic. No erythema. Psychiatric: She has a normal mood and affect. Her behavior is normal. Judgment and thought content normal.       ASSESSMENT and PLAN    ICD-10-CM ICD-9-CM    1. Postablative hypothyroidism E89.0 244.1 TSH RFX ON ABNORMAL TO FREE T4      AMB POC URINALYSIS DIP STICK AUTO W/O MICRO   2. Bronchitis J40 490    3. Upper respiratory tract infection, unspecified type J06.9 465.9 azithromycin (ZITHROMAX) 250 mg tablet   4. Menorrhagia with irregular cycle N92.1 626.2 CBC WITH AUTOMATED DIFF   5. Hypokalemia T76.6 245.5 METABOLIC PANEL, COMPREHENSIVE   6. Pure hypercholesterolemia E78.00 272.0 LIPID PANEL   7. Encounter for screening mammogram for malignant neoplasm of breast Z12.31 V76.12 [facility] MAMMO BI SCREENING INCL CAD   8.  Encounter for immunization Z23 V03.89 INFLUENZA VIRUS VAC QUAD,SPLIT,PRESV FREE SYRINGE IM      [place] IMMUNIZ ADMIN,1 SINGLE/COMB VAC/TOXOID     Follow-up Disposition:  Return if symptoms worsen or fail to improve.  lab results and schedule of future lab studies reviewed with patient  reviewed diet, exercise and weight control    Strongly encouraged compliance with thyroid hormone replacement    Follow up with gyn provider to discuss options to management AUB and uterine fibroid    Encouraged supportive care for respiratory symptoms, avoid cleaning ears with Qtip    Patient voices understanding and acceptance of this advice and will call back if any further questions or concerns. reviewed medications and side effects in detail      Patient voices understanding and acceptance of this advice and will call back if any further questions or concerns. An After Visit Summary was printed and given to the patient.

## 2018-09-20 NOTE — MR AVS SNAPSHOT
[address] [facility] [address] [place] [address] 
[phone] Patient: Jasmin Garay MRN: [id number] Visit Information Date & Time Provider Department Dept. Phone Encounter #  
 2018  9:15 AM Deadra Bence, [id number] Pediatrics and Internal [facility] [phone] [id number] Follow-up Instructions Return if symptoms worsen or fail to improve. Upcoming Health Maintenance Date Due DTaP/Tdap/Td series (1 - Tdap) 2014 PAP AKA CERVICAL CYTOLOGY 2017 Influenza Age 5 to Adult 2018 Allergies as of 2018  Review Complete On: 2018 By: Vivienne Luna LPN Severity Noted Reaction Type Reactions Shellfish Derived  2014    Hives Current Immunizations  Reviewed on 8/3/2015 Name Date Influenza Vaccine (Quad) PF 2015 Td, Adsorbed PF 2014  7:33 AM  
  
 Not reviewed this visit You Were Diagnosed With   
  
 Codes Comments Postablative hypothyroidism    -  Primary ICD-10-CM: E89.0 ICD-9-CM: 244.1 Menorrhagia with irregular cycle     ICD-10-CM: N92.1 ICD-9-CM: 626.2 Hypokalemia     ICD-10-CM: E87.6 ICD-9-CM: 276.8 Encounter for screening mammogram for malignant neoplasm of breast     ICD-10-CM: Z12.31 
ICD-9-CM: V76.12 Bronchitis     ICD-10-CM: J40 ICD-9-CM: 102 Pure hypercholesterolemia     ICD-10-CM: E78.00 ICD-9-CM: 272.0 Upper respiratory tract infection, unspecified type     ICD-10-CM: J06.9 ICD-9-CM: 465.9 Vitals BP Pulse Temp Resp Height(growth percentile) Weight(growth percentile) 130/90 (BP 1 Location: Left arm, BP Patient Position: Sitting) 79 98.6 °F (37 °C) (Oral) 18 5' 7.01\" (1.702 m) 158 lb 12.8 oz (72 kg) LMP SpO2 BMI OB Status Smoking Status 2018 (Approximate) 99% 24.86 kg/m2 Chemically Induced Never Smoker Vitals History BMI and BSA Data Body Mass Index Body Surface Area  
 24.86 kg/m 2 1.85 m 2 Preferred Pharmacy Pharmacy Name Phone [address] [facility] [phone] Your Updated Medication List  
  
   
This list is accurate as of 9/20/18 10:15 AM.  Always use your most recent med list.  
  
  
  
  
 azithromycin 250 mg tablet Commonly known as:  Evelene Smiling Take 2 tablets today, then take 1 tablet daily  
  
 levothyroxine 50 mcg tablet Commonly known as:  SYNTHROID Take 1 Tab by mouth Daily (before breakfast). Indications: hypothyroidism Prescriptions Sent to Pharmacy Refills  
 azithromycin (ZITHROMAX) 250 mg tablet 0 Sig: Take 2 tablets today, then take 1 tablet daily Class: Normal  
 Pharmacy: [facility] [address] [place] [address] [facility] Ph #: [phone] We Performed the Following AMB POC URINALYSIS DIP STICK AUTO W/O MICRO [33040 CPT(R)] CBC WITH AUTOMATED DIFF [43392 CPT(R)] LIPID PANEL [23527 CPT(R)] METABOLIC PANEL, COMPREHENSIVE [02019 CPT(R)] TSH RFX ON ABNORMAL TO FREE T4 [[id number] Custom] Follow-up Instructions Return if symptoms worsen or fail to improve. To-Do List   
 09/20/2018 Imaging:  [facility] BI SCREENING INCL CAD Patient Instructions Learning About the Safe Use of Antibiotics Introduction Antibiotics are drugs used to kill bacteria. Bacteria can cause infections. These include strep throat, ear infections, and pneumonia. These medicines can't cure everything. They don't kill viruses or help with allergies. They don't help illnesses such as the common cold, the flu, or a runny nose. And they can cause side effects. There are many types of antibiotics. Your doctor will decide which one will work best for your infection. Examples include: · Amoxicillin. · Cephalexin (Keflex). · Ciprofloxacin (Cipro). What are the possible side effects? Side effects can include: 
· Nausea. · Diarrhea. · Skin rash. · Yeast infection. · A severe allergic reaction. It may cause itching, swelling, and breathing problems. This is rare. You may have other side effects or reactions not listed here. Check the information that comes with your medicine. Should you take antibiotics just in case? Don't take antibiotics when you don't need them. If you do that, they may not work when you do need them. Each time you take antibiotics, you are more likely to have some bacteria that survive and aren't killed by the medicine. Bacteria that don't die can change and become even harder to kill. These are called antibiotic-resistant bacteria. They can cause longer and more serious infections. To treat them, you may need different, stronger antibiotics that have more side effects and may cost more. So always ask your doctor if antibiotics are the best treatment. Explain that you do not want antibiotics unless you need them. Help protect the community Using antibiotics when they're not needed leads to the development of antibiotic-resistant bacteria. These tougher bacteria can spread to family members, children, and coworkers. People in your community will have a risk of getting an infection that is harder to cure and that costs more to treat. How can you take antibiotics safely? Be safe with medicine. Take your antibiotics as directed. Do not stop taking them just because you feel better. You need to take the full course of medicine. This will help make sure your infection is cured. It will also help prevent the growth of antibiotic-resistant bacteria. Always take the exact amount that the label says to take. If the label says to take the medicine at a certain time, follow those directions. You might feel better after you take an antibiotic for a few days. But it is important to keep taking it for as long as prescribed.  That will help you get rid of those bacteria that are a bit stronger and that survive the first few days of treatment. Where can you learn more? Go to http://mercedes-jl.info/. Enter F695 in the search box to learn more about \"Learning About the Safe Use of Antibiotics. \" Current as of: November 18, 2017 Content Version: 11.7 © 5874-0129 [employer]. Care instructions adapted under license by [employer] (which disclaims liability or warranty for this information). If you have questions about a medical condition or this instruction, always ask your healthcare professional. [address] any warranty or liability for your use of this information. Bronchitis: Care Instructions Your Care Instructions Bronchitis is inflammation of the bronchial tubes, which carry air to the lungs. The tubes swell and produce mucus, or phlegm. The mucus and inflamed bronchial tubes make you cough. You may have trouble breathing. Most cases of bronchitis are caused by viruses like those that cause colds. Antibiotics usually do not help and they may be harmful. Bronchitis usually develops rapidly and lasts about 2 to 3 weeks in otherwise healthy people. Follow-up care is a key part of your treatment and safety. Be sure to make and go to all appointments, and call your doctor if you are having problems. It's also a good idea to know your test results and keep a list of the medicines you take. How can you care for yourself at home? · Take all medicines exactly as prescribed. Call your doctor if you think you are having a problem with your medicine. · Get some extra rest. 
· Take an over-the-counter pain medicine, such as acetaminophen (Tylenol), ibuprofen (Advil, Motrin), or naproxen (Aleve) to reduce fever and relieve body aches. Read and follow all instructions on the label.  
· Do not take two or more pain medicines at the same time unless the doctor told you to. Many pain medicines have acetaminophen, which is Tylenol. Too much acetaminophen (Tylenol) can be harmful. · Take an over-the-counter cough medicine that contains dextromethorphan to help quiet a dry, hacking cough so that you can sleep. Avoid cough medicines that have more than one active ingredient. Read and follow all instructions on the label. · Breathe moist air from a humidifier, hot shower, or sink filled with hot water. The heat and moisture will thin mucus so you can cough it out. · Do not smoke. Smoking can make bronchitis worse. If you need help quitting, talk to your doctor about stop-smoking programs and medicines. These can increase your chances of quitting for good. When should you call for help? Call 911 anytime you think you may need emergency care. For example, call if: 
  · You have severe trouble breathing.  
 Call your doctor now or seek immediate medical care if: 
  · You have new or worse trouble breathing.  
  · You cough up dark brown or bloody mucus (sputum).  
  · You have a new or higher fever.  
  · You have a new rash.  
 Watch closely for changes in your health, and be sure to contact your doctor if: 
  · You cough more deeply or more often, especially if you notice more mucus or a change in the color of your mucus.  
  · You are not getting better as expected. Where can you learn more? Go to http://mercedes-jl.info/. Enter H333 in the search box to learn more about \"Bronchitis: Care Instructions. \" Current as of: December 6, 2017 Content Version: 11.7 © 0739-4379 [employer]. Care instructions adapted under license by [employer] (which disclaims liability or warranty for this information). If you have questions about a medical condition or this instruction, always ask your healthcare professional. [address] any warranty or liability for your use of this information. Upper Respiratory Infection (Cold): Care Instructions Your Care Instructions An upper respiratory infection, or URI, is an infection of the nose, sinuses, or throat. URIs are spread by coughs, sneezes, and direct contact. The common cold is the most frequent kind of URI. The flu and sinus infections are other kinds of URIs. Almost all URIs are caused by viruses. Antibiotics won't cure them. But you can treat most infections with home care. This may include drinking lots of fluids and taking over-the-counter pain medicine. You will probably feel better in 4 to 10 days. The doctor has checked you carefully, but problems can develop later. If you notice any problems or new symptoms, get medical treatment right away. Follow-up care is a key part of your treatment and safety. Be sure to make and go to all appointments, and call your doctor if you are having problems. It's also a good idea to know your test results and keep a list of the medicines you take. How can you care for yourself at home? · To prevent dehydration, drink plenty of fluids, enough so that your urine is light yellow or clear like water. Choose water and other caffeine-free clear liquids until you feel better. If you have kidney, heart, or liver disease and have to limit fluids, talk with your doctor before you increase the amount of fluids you drink. · Take an over-the-counter pain medicine, such as acetaminophen (Tylenol), ibuprofen (Advil, Motrin), or naproxen (Aleve). Read and follow all instructions on the label. · Before you use cough and cold medicines, check the label. These medicines may not be safe for young children or for people with certain health problems. · Be careful when taking over-the-counter cold or flu medicines and Tylenol at the same time. Many of these medicines have acetaminophen, which is Tylenol.  Read the labels to make sure that you are not taking more than the recommended dose. Too much acetaminophen (Tylenol) can be harmful. · Get plenty of rest. 
· Do not smoke or allow others to smoke around you. If you need help quitting, talk to your doctor about stop-smoking programs and medicines. These can increase your chances of quitting for good. When should you call for help? Call 911 anytime you think you may need emergency care. For example, call if: 
  · You have severe trouble breathing.  
 Call your doctor now or seek immediate medical care if: 
  · You seem to be getting much sicker.  
  · You have new or worse trouble breathing.  
  · You have a new or higher fever.  
  · You have a new rash.  
 Watch closely for changes in your health, and be sure to contact your doctor if: 
  · You have a new symptom, such as a sore throat, an earache, or sinus pain.  
  · You cough more deeply or more often, especially if you notice more mucus or a change in the color of your mucus.  
  · You do not get better as expected. Where can you learn more? Go to http://mercedes-jl.info/. Enter C131 in the search box to learn more about \"Upper Respiratory Infection (Cold): Care Instructions. \" Current as of: December 6, 2017 Content Version: 11.7 © 8487-2071 [employer]. Care instructions adapted under license by [employer] (which disclaims liability or warranty for this information). If you have questions about a medical condition or this instruction, always ask your healthcare professional. [place] [address] any warranty or liability for your use of this information. Introducing [facility]! [employer] introduces [employer] patient portal. Now you can access parts of your medical record, email your doctor's office, and request medication refills online. 1. In your internet browser, go to [email or web address] 2. Click on the First Time User? Click Here link in the Sign In box. You will see the New Member Sign Up page. 3. Enter your CarWoo! Access Code exactly as it appears below. You will not need to use this code after youve completed the sign-up process. If you do not sign up before the expiration date, you must request a new code. · CarWoo! Access Code: [email or web address] Expires: 12/19/2018 10:15 AM 
 
4. Enter the last four digits of your Social Security Number (xxxx) and Date of Birth (mm/dd/yyyy) as indicated and click Submit. You will be taken to the next sign-up page. 5. Create a CarWoo! ID. This will be your CarWoo! login ID and cannot be changed, so think of one that is secure and easy to remember. 6. Create a CarWoo! password. You can change your password at any time. 7. Enter your Password Reset Question and Answer. This can be used at a later time if you forget your password. 8. Enter your e-mail address. You will receive e-mail notification when new information is available in [address] 9. Click Sign Up. You can now view and download portions of your medical record. 10. Click the Download Summary menu link to download a portable copy of your medical information. If you have questions, please visit the Frequently Asked Questions section of the CarWoo! website. Remember, CarWoo! is NOT to be used for urgent needs. For medical emergencies, dial 911. Now available from your iPhone and Android! Please provide this summary of care documentation to your next provider. Your primary care clinician is listed as Juanice Scheuermann. If you have any questions after today's visit, please call [phone].

## 2018-09-20 NOTE — PATIENT INSTRUCTIONS
Learning About the Safe Use of Antibiotics  Introduction    Antibiotics are drugs used to kill bacteria. Bacteria can cause infections. These include strep throat, ear infections, and pneumonia. These medicines can't cure everything. They don't kill viruses or help with allergies. They don't help illnesses such as the common cold, the flu, or a runny nose. And they can cause side effects. There are many types of antibiotics. Your doctor will decide which one will work best for your infection. Examples include:  · Amoxicillin. · Cephalexin (Keflex). · Ciprofloxacin (Cipro). What are the possible side effects? Side effects can include:  · Nausea. · Diarrhea. · Skin rash. · Yeast infection. · A severe allergic reaction. It may cause itching, swelling, and breathing problems. This is rare. You may have other side effects or reactions not listed here. Check the information that comes with your medicine. Should you take antibiotics just in case? Don't take antibiotics when you don't need them. If you do that, they may not work when you do need them. Each time you take antibiotics, you are more likely to have some bacteria that survive and aren't killed by the medicine. Bacteria that don't die can change and become even harder to kill. These are called antibiotic-resistant bacteria. They can cause longer and more serious infections. To treat them, you may need different, stronger antibiotics that have more side effects and may cost more. So always ask your doctor if antibiotics are the best treatment. Explain that you do not want antibiotics unless you need them. Help protect the community  Using antibiotics when they're not needed leads to the development of antibiotic-resistant bacteria. These tougher bacteria can spread to family members, children, and coworkers. People in your community will have a risk of getting an infection that is harder to cure and that costs more to treat.   How can you take antibiotics safely? Be safe with medicine. Take your antibiotics as directed. Do not stop taking them just because you feel better. You need to take the full course of medicine. This will help make sure your infection is cured. It will also help prevent the growth of antibiotic-resistant bacteria. Always take the exact amount that the label says to take. If the label says to take the medicine at a certain time, follow those directions. You might feel better after you take an antibiotic for a few days. But it is important to keep taking it for as long as prescribed. That will help you get rid of those bacteria that are a bit stronger and that survive the first few days of treatment. Where can you learn more? Go to http://mercedes-jl.info/. Enter F695 in the search box to learn more about \"Learning About the Safe Use of Antibiotics. \"  Current as of: November 18, 2017  Content Version: 11.7  © 7858-7896 [employer]. Care instructions adapted under license by [employer] (which disclaims liability or warranty for this information). If you have questions about a medical condition or this instruction, always ask your healthcare professional. [place] [address] any warranty or liability for your use of this information. Bronchitis: Care Instructions  Your Care Instructions    Bronchitis is inflammation of the bronchial tubes, which carry air to the lungs. The tubes swell and produce mucus, or phlegm. The mucus and inflamed bronchial tubes make you cough. You may have trouble breathing. Most cases of bronchitis are caused by viruses like those that cause colds. Antibiotics usually do not help and they may be harmful. Bronchitis usually develops rapidly and lasts about 2 to 3 weeks in otherwise healthy people. Follow-up care is a key part of your treatment and safety.  Be sure to make and go to all appointments, and call your doctor if you are having problems. It's also a good idea to know your test results and keep a list of the medicines you take. How can you care for yourself at home? · Take all medicines exactly as prescribed. Call your doctor if you think you are having a problem with your medicine. · Get some extra rest.  · Take an over-the-counter pain medicine, such as acetaminophen (Tylenol), ibuprofen (Advil, Motrin), or naproxen (Aleve) to reduce fever and relieve body aches. Read and follow all instructions on the label. · Do not take two or more pain medicines at the same time unless the doctor told you to. Many pain medicines have acetaminophen, which is Tylenol. Too much acetaminophen (Tylenol) can be harmful. · Take an over-the-counter cough medicine that contains dextromethorphan to help quiet a dry, hacking cough so that you can sleep. Avoid cough medicines that have more than one active ingredient. Read and follow all instructions on the label. · Breathe moist air from a humidifier, hot shower, or sink filled with hot water. The heat and moisture will thin mucus so you can cough it out. · Do not smoke. Smoking can make bronchitis worse. If you need help quitting, talk to your doctor about stop-smoking programs and medicines. These can increase your chances of quitting for good. When should you call for help? Call 911 anytime you think you may need emergency care. For example, call if:    · You have severe trouble breathing.    Call your doctor now or seek immediate medical care if:    · You have new or worse trouble breathing.     · You cough up dark brown or bloody mucus (sputum).     · You have a new or higher fever.     · You have a new rash.    Watch closely for changes in your health, and be sure to contact your doctor if:    · You cough more deeply or more often, especially if you notice more mucus or a change in the color of your mucus.     · You are not getting better as expected. Where can you learn more?   Go to http://mercedes-jl.info/. Enter H333 in the search box to learn more about \"Bronchitis: Care Instructions. \"  Current as of: December 6, 2017  Content Version: 11.7  © 3680-8232 [employer]. Care instructions adapted under license by [employer] (which disclaims liability or warranty for this information). If you have questions about a medical condition or this instruction, always ask your healthcare professional. [place] [address] any warranty or liability for your use of this information. Upper Respiratory Infection (Cold): Care Instructions  Your Care Instructions    An upper respiratory infection, or URI, is an infection of the nose, sinuses, or throat. URIs are spread by coughs, sneezes, and direct contact. The common cold is the most frequent kind of URI. The flu and sinus infections are other kinds of URIs. Almost all URIs are caused by viruses. Antibiotics won't cure them. But you can treat most infections with home care. This may include drinking lots of fluids and taking over-the-counter pain medicine. You will probably feel better in 4 to 10 days. The doctor has checked you carefully, but problems can develop later. If you notice any problems or new symptoms, get medical treatment right away. Follow-up care is a key part of your treatment and safety. Be sure to make and go to all appointments, and call your doctor if you are having problems. It's also a good idea to know your test results and keep a list of the medicines you take. How can you care for yourself at home? · To prevent dehydration, drink plenty of fluids, enough so that your urine is light yellow or clear like water. Choose water and other caffeine-free clear liquids until you feel better. If you have kidney, heart, or liver disease and have to limit fluids, talk with your doctor before you increase the amount of fluids you drink.   · Take an over-the-counter pain medicine, such as acetaminophen (Tylenol), ibuprofen (Advil, Motrin), or naproxen (Aleve). Read and follow all instructions on the label. · Before you use cough and cold medicines, check the label. These medicines may not be safe for young children or for people with certain health problems. · Be careful when taking over-the-counter cold or flu medicines and Tylenol at the same time. Many of these medicines have acetaminophen, which is Tylenol. Read the labels to make sure that you are not taking more than the recommended dose. Too much acetaminophen (Tylenol) can be harmful. · Get plenty of rest.  · Do not smoke or allow others to smoke around you. If you need help quitting, talk to your doctor about stop-smoking programs and medicines. These can increase your chances of quitting for good. When should you call for help? Call 911 anytime you think you may need emergency care. For example, call if:    · You have severe trouble breathing.    Call your doctor now or seek immediate medical care if:    · You seem to be getting much sicker.     · You have new or worse trouble breathing.     · You have a new or higher fever.     · You have a new rash.    Watch closely for changes in your health, and be sure to contact your doctor if:    · You have a new symptom, such as a sore throat, an earache, or sinus pain.     · You cough more deeply or more often, especially if you notice more mucus or a change in the color of your mucus.     · You do not get better as expected. Where can you learn more? Go to http://mercedes-jl.info/. Enter I127 in the search box to learn more about \"Upper Respiratory Infection (Cold): Care Instructions. \"  Current as of: December 6, 2017  Content Version: 11.7  © 3648-3780 [employer]. Care instructions adapted under license by [employer] (which disclaims liability or warranty for this information).  If you have questions about a medical condition or this instruction, always ask your healthcare professional. [place] [address] any warranty or liability for your use of this information.

## 2018-09-21 LAB
ALBUMIN SERPL-MCNC: 4.2 G/DL (ref 3.5–5.5)
ALBUMIN/GLOB SERPL: 1.6 {RATIO} (ref 1.2–2.2)
ALP SERPL-CCNC: 65 IU/L (ref 39–117)
ALT SERPL-CCNC: 14 IU/L (ref 0–32)
AST SERPL-CCNC: 24 IU/L (ref 0–40)
BASOPHILS # BLD AUTO: 0 X10E3/UL (ref 0–0.2)
BASOPHILS NFR BLD AUTO: 1 %
BILIRUB SERPL-MCNC: 0.3 MG/DL (ref 0–1.2)
BUN SERPL-MCNC: 14 MG/DL (ref 6–24)
BUN/CREAT SERPL: 17 (ref 9–23)
CALCIUM SERPL-MCNC: 9.4 MG/DL (ref 8.7–10.2)
CHLORIDE SERPL-SCNC: 103 MMOL/L (ref 96–106)
CHOLEST SERPL-MCNC: 204 MG/DL (ref 100–199)
CO2 SERPL-SCNC: 26 MMOL/L (ref 20–29)
CREAT SERPL-MCNC: 0.83 MG/DL (ref 0.57–1)
EOSINOPHIL # BLD AUTO: 0.1 X10E3/UL (ref 0–0.4)
EOSINOPHIL NFR BLD AUTO: 4 %
ERYTHROCYTE [DISTWIDTH] IN BLOOD BY AUTOMATED COUNT: 14 % (ref 12.3–15.4)
GLOBULIN SER CALC-MCNC: 2.7 G/DL (ref 1.5–4.5)
GLUCOSE SERPL-MCNC: 93 MG/DL (ref 65–99)
HCT VFR BLD AUTO: 37 % (ref 34–46.6)
HDLC SERPL-MCNC: 69 MG/DL
HGB BLD-MCNC: 12 G/DL (ref 11.1–15.9)
IMM GRANULOCYTES # BLD: 0 X10E3/UL (ref 0–0.1)
IMM GRANULOCYTES NFR BLD: 0 %
LDLC SERPL CALC-MCNC: 121 MG/DL (ref 0–99)
LYMPHOCYTES # BLD AUTO: 1.6 X10E3/UL (ref 0.7–3.1)
LYMPHOCYTES NFR BLD AUTO: 41 %
MCH RBC QN AUTO: 28.1 PG (ref 26.6–33)
MCHC RBC AUTO-ENTMCNC: 32.4 G/DL (ref 31.5–35.7)
MCV RBC AUTO: 87 FL (ref 79–97)
MONOCYTES # BLD AUTO: 0.3 X10E3/UL (ref 0.1–0.9)
MONOCYTES NFR BLD AUTO: 8 %
NEUTROPHILS # BLD AUTO: 1.9 X10E3/UL (ref 1.4–7)
NEUTROPHILS NFR BLD AUTO: 46 %
PLATELET # BLD AUTO: 315 X10E3/UL (ref 150–379)
POTASSIUM SERPL-SCNC: 4.2 MMOL/L (ref 3.5–5.2)
PROT SERPL-MCNC: 6.9 G/DL (ref 6–8.5)
RBC # BLD AUTO: 4.27 X10E6/UL (ref 3.77–5.28)
SODIUM SERPL-SCNC: 142 MMOL/L (ref 134–144)
T4 FREE SERPL-MCNC: 0.83 NG/DL (ref 0.82–1.77)
TRIGL SERPL-MCNC: 68 MG/DL (ref 0–149)
TSH SERPL DL<=0.005 MIU/L-ACNC: 9.09 UIU/ML (ref 0.45–4.5)
VLDLC SERPL CALC-MCNC: 14 MG/DL (ref 5–40)
WBC # BLD AUTO: 4 X10E3/UL (ref 3.4–10.8)

## 2018-09-24 ENCOUNTER — TELEPHONE (OUTPATIENT)
Dept: INTERNAL MEDICINE CLINIC | Age: 43
End: 2018-09-24

## 2018-09-24 DIAGNOSIS — J40 BRONCHITIS: Primary | ICD-10-CM

## 2018-09-24 RX ORDER — LEVOTHYROXINE SODIUM 75 UG/1
75 TABLET ORAL
Qty: 90 TAB | Refills: 0 | Status: SHIPPED | OUTPATIENT
Start: 2018-09-24 | End: 2019-03-09 | Stop reason: DRUGHIGH

## 2018-09-24 NOTE — TELEPHONE ENCOUNTER
Please advise patient to increase thyroid medication to 1.5 tablet or 75 mcg daily until she picks up medication from pharmacy.

## 2018-09-24 NOTE — TELEPHONE ENCOUNTER
Pt verbalized understanding; will  new dose from pharmacy. Pt also states that she had an URI and was taking antibiotic that she finished. She states \"she feels as if a lump is in her upper right lung lobe, and is blocking her airway\". NEG for difficulty breathing, SOB, fever, cough. Is requesting a chest XRAY.    Please advise

## 2019-03-08 ENCOUNTER — OFFICE VISIT (OUTPATIENT)
Dept: INTERNAL MEDICINE CLINIC | Age: 44
End: 2019-03-08

## 2019-03-08 VITALS
WEIGHT: 171.8 LBS | OXYGEN SATURATION: 100 % | BODY MASS INDEX: 26.97 KG/M2 | HEART RATE: 84 BPM | SYSTOLIC BLOOD PRESSURE: 132 MMHG | RESPIRATION RATE: 19 BRPM | DIASTOLIC BLOOD PRESSURE: 87 MMHG | HEIGHT: 67 IN | TEMPERATURE: 99.7 F

## 2019-03-08 DIAGNOSIS — B34.9 VIRAL SYNDROME: Primary | ICD-10-CM

## 2019-03-08 DIAGNOSIS — E89.0 POSTABLATIVE HYPOTHYROIDISM: ICD-10-CM

## 2019-03-08 DIAGNOSIS — R52 GENERALIZED BODY ACHES: ICD-10-CM

## 2019-03-08 DIAGNOSIS — J02.9 SORE THROAT: ICD-10-CM

## 2019-03-08 LAB
FLUAV+FLUBV AG NOSE QL IA.RAPID: NEGATIVE POS/NEG
FLUAV+FLUBV AG NOSE QL IA.RAPID: NEGATIVE POS/NEG
S PYO AG THROAT QL: NEGATIVE
VALID INTERNAL CONTROL?: YES
VALID INTERNAL CONTROL?: YES

## 2019-03-08 RX ORDER — OSELTAMIVIR PHOSPHATE 75 MG/1
75 CAPSULE ORAL 2 TIMES DAILY
Qty: 10 CAP | Refills: 0 | Status: SHIPPED | OUTPATIENT
Start: 2019-03-08 | End: 2019-03-13

## 2019-03-08 NOTE — PROGRESS NOTES
Exam room [place] [address] Dayna Patel is a 39 y.o. female  Chief Complaint   Patient presents with    Generalized Body Aches    Sore Throat    Cough     Visit Vitals  /87 (BP 1 Location: Left arm, BP Patient Position: Sitting)   Pulse 84   Temp 99.7 °F (37.6 °C) (Oral)   Resp 19   Ht 5' 7.01\" (1.702 m)   Wt 171 lb 12.8 oz (77.9 kg)   SpO2 100%   BMI 26.90 kg/m²     1. Have you been to the ER, urgent care clinic since your last visit? Hospitalized since your last visit? No    2. Have you seen or consulted any other health care providers outside of the [address] [place] [address] since your last visit? Include any pap smears or colon screening.  No  Health Maintenance Due   Topic Date Due    DTaP/Tdap/Td series (1 - Tdap) 08/07/2014    PAP AKA CERVICAL CYTOLOGY  05/07/2017     Learning Assessment 1/5/2015   PRIMARY LEARNER Patient   HIGHEST LEVEL OF EDUCATION - PRIMARY LEARNER  2 YEARS OF COLLEGE   BARRIERS PRIMARY LEARNER -   PRIMARY LANGUAGE ENGLISH   LEARNER PREFERENCE PRIMARY READING   ANSWERED BY pateint   RELATIONSHIP SELF

## 2019-03-08 NOTE — PROGRESS NOTES
HISTORY OF PRESENT ILLNESS  Naseem Davis is a 39 y.o. female presents for acute care   HPI  She reports fatigue, cough, generalized body aches for ~ 2 days     Throat scratchy for  3 days ago      Children with similar symptoms, treated for flu. Past Medical History:   Diagnosis Date    Anemia NEC     Asthma     HPV in female     Hypertension     Thyroid disease        No current outpatient medications on file prior to visit. No current facility-administered medications on file prior to visit. Review of Systems   Constitutional: Positive for chills, fever and malaise/fatigue. Respiratory: Positive for cough. Negative for sputum production. Cardiovascular: Negative. Gastrointestinal: Negative. Genitourinary: Negative. Musculoskeletal: Positive for myalgias. Neurological: Negative. Blood pressure 132/87, pulse 84, temperature 99.7 °F (37.6 °C), temperature source Oral, resp. rate 19, height 5' 7.01\" (1.702 m), weight 171 lb 12.8 oz (77.9 kg), last menstrual period 02/01/2019, SpO2 100 %. Physical Exam   Constitutional: She is oriented to person, place, and time. She appears well-developed and well-nourished. She appears ill. No distress. Eyes: Conjunctivae are normal. Right eye exhibits no discharge. Left eye exhibits no discharge. Cardiovascular: Normal rate, regular rhythm and normal heart sounds. Pulmonary/Chest: Effort normal and breath sounds normal.   Abdominal: Soft. Musculoskeletal: She exhibits no edema or tenderness. Lymphadenopathy:     She has no cervical adenopathy. Neurological: She is alert and oriented to person, place, and time. Skin: Skin is warm and dry. No rash noted. She is not diaphoretic. No erythema. ASSESSMENT and PLAN    ICD-10-CM ICD-9-CM    1. Viral syndrome B34.9 079.99    2. Generalized body aches R52 780.96 AMB POC NELSON INFLUENZA A/B TEST      CBC WITH AUTOMATED DIFF      oseltamivir (TAMIFLU) 75 mg capsule   3.  Sore throat J02.9 462 AMB POC RAPID STREP A      CBC WITH AUTOMATED DIFF   4. Postablative hypothyroidism E89.0 244.1 TSH RFX ON ABNORMAL TO FREE T4     Follow-up Disposition:  Return if symptoms worsen or fail to improve.  lab results and schedule of future lab studies reviewed with patient  reviewed medications and side effects in detail    Negative strep, flu    Will treat empirically for influenza   Reviewed supportive care measures, indications for ED evaluation, call to provider     Patient voices understanding and acceptance of this advice and will call back if any further questions or concerns. An After Visit Summary was printed and given to the patient.

## 2019-03-08 NOTE — PATIENT INSTRUCTIONS

## 2019-03-09 ENCOUNTER — TELEPHONE (OUTPATIENT)
Dept: INTERNAL MEDICINE CLINIC | Age: 44
End: 2019-03-09

## 2019-03-09 LAB
BASOPHILS # BLD AUTO: 0 X10E3/UL (ref 0–0.2)
BASOPHILS NFR BLD AUTO: 0 %
EOSINOPHIL # BLD AUTO: 0.1 X10E3/UL (ref 0–0.4)
EOSINOPHIL NFR BLD AUTO: 2 %
ERYTHROCYTE [DISTWIDTH] IN BLOOD BY AUTOMATED COUNT: 15.2 % (ref 12.3–15.4)
HCT VFR BLD AUTO: 37 % (ref 34–46.6)
HGB BLD-MCNC: 11.8 G/DL (ref 11.1–15.9)
IMM GRANULOCYTES # BLD AUTO: 0 X10E3/UL (ref 0–0.1)
IMM GRANULOCYTES NFR BLD AUTO: 0 %
LYMPHOCYTES # BLD AUTO: 0.9 X10E3/UL (ref 0.7–3.1)
LYMPHOCYTES NFR BLD AUTO: 19 %
MCH RBC QN AUTO: 25.9 PG (ref 26.6–33)
MCHC RBC AUTO-ENTMCNC: 31.9 G/DL (ref 31.5–35.7)
MCV RBC AUTO: 81 FL (ref 79–97)
MONOCYTES # BLD AUTO: 0.6 X10E3/UL (ref 0.1–0.9)
MONOCYTES NFR BLD AUTO: 13 %
NEUTROPHILS # BLD AUTO: 3.2 X10E3/UL (ref 1.4–7)
NEUTROPHILS NFR BLD AUTO: 66 %
PLATELET # BLD AUTO: 301 X10E3/UL (ref 150–379)
RBC # BLD AUTO: 4.56 X10E6/UL (ref 3.77–5.28)
T4 FREE SERPL-MCNC: 0.86 NG/DL (ref 0.82–1.77)
TSH SERPL DL<=0.005 MIU/L-ACNC: 6.04 UIU/ML (ref 0.45–4.5)
WBC # BLD AUTO: 4.9 X10E3/UL (ref 3.4–10.8)

## 2019-03-09 RX ORDER — LEVOTHYROXINE SODIUM 88 UG/1
88 TABLET ORAL
Qty: 90 TAB | Refills: 0 | Status: SHIPPED | OUTPATIENT
Start: 2019-03-09 | End: 2019-07-26 | Stop reason: SDUPTHER

## 2019-07-26 ENCOUNTER — OFFICE VISIT (OUTPATIENT)
Dept: INTERNAL MEDICINE CLINIC | Age: 44
End: 2019-07-26

## 2019-07-26 VITALS
RESPIRATION RATE: 17 BRPM | HEART RATE: 66 BPM | OXYGEN SATURATION: 100 % | SYSTOLIC BLOOD PRESSURE: 129 MMHG | WEIGHT: 178 LBS | DIASTOLIC BLOOD PRESSURE: 84 MMHG | TEMPERATURE: 98.4 F | HEIGHT: 67 IN | BODY MASS INDEX: 27.94 KG/M2

## 2019-07-26 DIAGNOSIS — E89.0 POSTABLATIVE HYPOTHYROIDISM: Primary | ICD-10-CM

## 2019-07-26 DIAGNOSIS — E78.00 PURE HYPERCHOLESTEROLEMIA: ICD-10-CM

## 2019-07-26 RX ORDER — LEVOTHYROXINE SODIUM 88 UG/1
88 TABLET ORAL
Qty: 90 TAB | Refills: 0 | Status: SHIPPED | OUTPATIENT
Start: 2019-07-26 | End: 2019-10-28 | Stop reason: SDUPTHER

## 2019-07-26 NOTE — PROGRESS NOTES
RM 9    Chief Complaint   Patient presents with    Follow-up     thyroid    Medication Refill     patient reports out of her medication, patient out of thyroid medication      1. Have you been to the ER, urgent care clinic since your last visit? Hospitalized since your last visit? No    2. Have you seen or consulted any other health care providers outside of the [address] [place] [address] since your last visit? Include any pap smears or colon screening.  No    Health Maintenance Due   Topic Date Due    DTaP/Tdap/Td series (1 - Tdap) 08/07/2014    PAP AKA CERVICAL CYTOLOGY  05/07/2017       Learning Assessment 1/5/2015   PRIMARY LEARNER Patient   HIGHEST LEVEL OF EDUCATION - PRIMARY LEARNER  2 YEARS OF COLLEGE   BARRIERS PRIMARY LEARNER -   PRIMARY LANGUAGE ENGLISH   LEARNER PREFERENCE PRIMARY READING   ANSWERED BY pateint   RELATIONSHIP SELF

## 2019-07-26 NOTE — PATIENT INSTRUCTIONS
High Cholesterol: Care Instructions  Your Care Instructions    Cholesterol is a type of fat in your blood. It is needed for many body functions, such as making new cells. Cholesterol is made by your body. It also comes from food you eat. High cholesterol means that you have too much of the fat in your blood. This raises your risk of a heart attack and stroke. LDL and HDL are part of your total cholesterol. LDL is the \"bad\" cholesterol. High LDL can raise your risk for heart disease, heart attack, and stroke. HDL is the \"good\" cholesterol. It helps clear bad cholesterol from the body. High HDL is linked with a lower risk of heart disease, heart attack, and stroke. Your cholesterol levels help your doctor find out your risk for having a heart attack or stroke. You and your doctor can talk about whether you need to lower your risk and what treatment is best for you. A heart-healthy lifestyle along with medicines can help lower your cholesterol and your risk. The way you choose to lower your risk will depend on how high your risk is for heart attack and stroke. It will also depend on how you feel about taking medicines. Follow-up care is a key part of your treatment and safety. Be sure to make and go to all appointments, and call your doctor if you are having problems. It's also a good idea to know your test results and keep a list of the medicines you take. How can you care for yourself at home? · Eat a variety of foods every day. Good choices include fruits, vegetables, whole grains (like oatmeal), dried beans and peas, nuts and seeds, soy products (like tofu), and fat-free or low-fat dairy products. · Replace butter, margarine, and hydrogenated or partially hydrogenated oils with olive and canola oils. (Canola oil margarine without trans fat is fine.)  · Replace red meat with fish, poultry, and soy protein (like tofu). · Limit processed and packaged foods like chips, crackers, and cookies.   · Bake, broil, or steam foods. Don't olson them. · Be physically active. Get at least 30 minutes of exercise on most days of the week. Walking is a good choice. You also may want to do other activities, such as running, swimming, cycling, or playing tennis or team sports. · Stay at a healthy weight or lose weight by making the changes in eating and physical activity listed above. Losing just a small amount of weight, even 5 to 10 pounds, can reduce your risk for having a heart attack or stroke. · Do not smoke. When should you call for help? Watch closely for changes in your health, and be sure to contact your doctor if:    · You need help making lifestyle changes.     · You have questions about your medicine. Where can you learn more? Go to http://mercedesParakweetjl.info/. Enter G008 in the search box to learn more about \"High Cholesterol: Care Instructions. \"  Current as of: July 22, 2018  Content Version: 12.1  © 4947-0706 [employer]. Care instructions adapted under license by [employer] (which disclaims liability or warranty for this information). If you have questions about a medical condition or this instruction, always ask your healthcare professional. [address] any warranty or liability for your use of this information. Hypothyroidism: Care Instructions  Your Care Instructions    You have hypothyroidism, which means that your body is not making enough thyroid hormone. This hormone helps your body use energy. If your thyroid level is low, you may feel tired, be constipated, have an increase in your blood pressure, or have dry skin or memory problems. You may also get cold easily, even when it is warm. Women with low thyroid levels may have heavy menstrual periods. A blood test to find your thyroid-stimulating hormone (TSH) level is used to check for hypothyroidism. A high TSH level may mean that you have low thyroid.  When your body is not making enough thyroid hormone, TSH levels rise in an effort to make the body produce more. The treatment for hypothyroidism is to take thyroid hormone pills. You should start to feel better in 1 to 2 weeks. But it can take several months to see changes in the TSH level. You will need regular visits with your doctor to make sure you have the right dose of medicine. Most people need treatment for the rest of their lives. You will need to see your doctor regularly to have blood tests and to make sure you are doing well. Follow-up care is a key part of your treatment and safety. Be sure to make and go to all appointments, and call your doctor if you are having problems. It's also a good idea to know your test results and keep a list of the medicines you take. How can you care for yourself at home? · Take your thyroid hormone medicine exactly as prescribed. Call your doctor if you think you are having a problem with your medicine. Most people do not have side effects if they take the right amount of medicine regularly. ? Take the medicine 30 minutes before breakfast, and do not take it with calcium, vitamins, or iron. ? Do not take extra doses of your thyroid medicine. It will not help you get better any faster, and it may cause side effects. ? If you forget to take a dose, do NOT take a double dose of medicine. Take your usual dose the next day. · Tell your doctor about all prescription, herbal, or over-the-counter products you take. · Take care of yourself. Eat a healthy diet, get enough sleep, and get regular exercise. When should you call for help? Call 911 anytime you think you may need emergency care.  For example, call if:    · You passed out (lost consciousness).     · You have severe trouble breathing.     · You have a very slow heartbeat (less than 60 beats a minute).     · You have a low body temperature (95°F or below).    Call your doctor now or seek immediate medical care if:    · You feel tired, sluggish, or weak.     · You have trouble remembering things or concentrating.     · You do not begin to feel better 2 weeks after starting your medicine.    Watch closely for changes in your health, and be sure to contact your doctor if you have any problems. Where can you learn more? Go to http://mercedes-jl.info/. Enter S061 in the search box to learn more about \"Hypothyroidism: Care Instructions. \"  Current as of: November 6, 2018  Content Version: 12.1  © 2544-8887 [employer]. Care instructions adapted under license by [employer] (which disclaims liability or warranty for this information). If you have questions about a medical condition or this instruction, always ask your healthcare professional. [address] any warranty or liability for your use of this information.

## 2019-07-30 NOTE — PROGRESS NOTES
HISTORY OF PRESENT ILLNESS  Marta Sherman is a 39 y.o. female presents for routine visit  HPI  She has not taken thyroid medication for several weeks; needs refill     Recently     Has gained weight    Denies interval change in medical history       Past Medical History:   Diagnosis Date    Anemia NEC     Asthma     HPV in female     Hypertension     Thyroid disease        Current Outpatient Medications   Medication Sig    levothyroxine (SYNTHROID) 88 mcg tablet Take 1 Tab by mouth Daily (before breakfast). No current facility-administered medications for this visit. Past Surgical History:   Procedure Laterality Date    HX GYN  2/18/14    HYSTEROSCOPY [facility] AND CURETTAGE, BARTHOLIN MARSUPILIZATION    HX TUBAL LIGATION         Allergies   Allergen Reactions    Shellfish Derived Hives     Review of Systems   Constitutional: Negative for malaise/fatigue and weight loss. Eyes: Negative. Respiratory: Negative. Cardiovascular: Negative for palpitations. Gastrointestinal: Negative. Genitourinary: Negative. Neurological: Negative. Visit Vitals  /84 (BP 1 Location: Left arm, BP Patient Position: Sitting)   Pulse 66   Temp 98.4 °F (36.9 °C) (Oral)   Resp 17   Ht 5' 7.01\" (1.702 m)   Wt 178 lb (80.7 kg)   LMP 07/24/2019 (Exact Date)   SpO2 100%   BMI 27.87 kg/m²     Physical Exam   Constitutional: She is oriented to person, place, and time. She appears well-developed and well-nourished. No distress. Cardiovascular: Normal rate, regular rhythm and normal heart sounds. Pulmonary/Chest: Effort normal and breath sounds normal.   Musculoskeletal: She exhibits no edema, tenderness or deformity. Neurological: She is alert and oriented to person, place, and time. Skin: Skin is warm and dry. She is not diaphoretic. Psychiatric: She has a normal mood and affect.  Her behavior is normal. Judgment and thought content normal.       ASSESSMENT and PLAN    ICD-10-CM ICD-9-CM    1. 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 Pure hypercholesterolemia E78.00 272.0 LIPID PANEL      METABOLIC PANEL, COMPREHENSIVE     Follow-up and Dispositions    · Return in about 2 months (around 9/26/2019) for fasting labs. Strongly encouraged medication compliance  She will return for fasting labs   lab results and schedule of future lab studies reviewed with patient  reviewed medications and side effects in detail    Patient voices understanding and acceptance of this advice and will call back if any further questions or concerns. An After Visit Summary was printed and given to the patient.

## 2019-09-27 ENCOUNTER — APPOINTMENT (OUTPATIENT)
Dept: GENERAL RADIOLOGY | Age: 44
End: 2019-09-27
Attending: EMERGENCY MEDICINE
Payer: COMMERCIAL

## 2019-09-27 ENCOUNTER — APPOINTMENT (OUTPATIENT)
Dept: CT IMAGING | Age: 44
End: 2019-09-27
Attending: EMERGENCY MEDICINE
Payer: COMMERCIAL

## 2019-09-27 ENCOUNTER — HOSPITAL ENCOUNTER (EMERGENCY)
Age: 44
Discharge: HOME | End: 2019-09-28
Attending: EMERGENCY MEDICINE
Payer: COMMERCIAL

## 2019-09-27 DIAGNOSIS — R91.1 RIGHT LOWER LOBE PULMONARY NODULE: ICD-10-CM

## 2019-09-27 DIAGNOSIS — R07.9 ACUTE CHEST PAIN: ICD-10-CM

## 2019-09-27 DIAGNOSIS — Z90.710 S/P HYSTERECTOMY: ICD-10-CM

## 2019-09-27 DIAGNOSIS — I26.99 ACUTE PULMONARY EMBOLISM WITHOUT ACUTE COR PULMONALE, UNSPECIFIED PULMONARY EMBOLISM TYPE (HCC): Primary | ICD-10-CM

## 2019-09-27 DIAGNOSIS — R06.02 SOB (SHORTNESS OF BREATH): ICD-10-CM

## 2019-09-27 LAB
ABO + RH BLD: NORMAL
ALBUMIN SERPL-MCNC: 3.4 G/DL (ref 3.5–5)
ALBUMIN/GLOB SERPL: 0.9 {RATIO} (ref 1.1–2.2)
ALP SERPL-CCNC: 58 U/L (ref 45–117)
ALT SERPL-CCNC: 19 U/L (ref 12–78)
ANION GAP SERPL CALC-SCNC: 5 MMOL/L (ref 5–15)
APPEARANCE UR: CLEAR
AST SERPL-CCNC: 18 U/L (ref 15–37)
BACTERIA URNS QL MICRO: NEGATIVE /HPF
BASOPHILS # BLD: 0 K/UL (ref 0–0.1)
BASOPHILS NFR BLD: 1 % (ref 0–1)
BILIRUB SERPL-MCNC: 0.3 MG/DL (ref 0.2–1)
BILIRUB UR QL: NEGATIVE
BLOOD GROUP ANTIBODIES SERPL: NORMAL
BNP SERPL-MCNC: 14 PG/ML
BUN SERPL-MCNC: 11 MG/DL (ref 6–20)
BUN/CREAT SERPL: 10 (ref 12–20)
CALCIUM SERPL-MCNC: 8.3 MG/DL (ref 8.5–10.1)
CHLORIDE SERPL-SCNC: 106 MMOL/L (ref 97–108)
CK SERPL-CCNC: 64 U/L (ref 26–192)
CO2 SERPL-SCNC: 27 MMOL/L (ref 21–32)
COLOR UR: ABNORMAL
CREAT SERPL-MCNC: 1.07 MG/DL (ref 0.55–1.02)
DIFFERENTIAL METHOD BLD: NORMAL
EOSINOPHIL # BLD: 0.4 K/UL (ref 0–0.4)
EOSINOPHIL NFR BLD: 5 % (ref 0–7)
EPITH CASTS URNS QL MICRO: ABNORMAL /LPF
ERYTHROCYTE [DISTWIDTH] IN BLOOD BY AUTOMATED COUNT: 13.6 % (ref 11.5–14.5)
GLOBULIN SER CALC-MCNC: 4 G/DL (ref 2–4)
GLUCOSE SERPL-MCNC: 114 MG/DL (ref 65–100)
GLUCOSE UR STRIP.AUTO-MCNC: NEGATIVE MG/DL
HCT VFR BLD AUTO: 37.1 % (ref 35–47)
HGB BLD-MCNC: 11.7 G/DL (ref 11.5–16)
HGB UR QL STRIP: ABNORMAL
HYALINE CASTS URNS QL MICRO: ABNORMAL /LPF (ref 0–5)
IMM GRANULOCYTES # BLD AUTO: 0 K/UL (ref 0–0.04)
IMM GRANULOCYTES NFR BLD AUTO: 0 % (ref 0–0.5)
KETONES UR QL STRIP.AUTO: NEGATIVE MG/DL
LEUKOCYTE ESTERASE UR QL STRIP.AUTO: ABNORMAL
LYMPHOCYTES # BLD: 2.5 K/UL (ref 0.8–3.5)
LYMPHOCYTES NFR BLD: 30 % (ref 12–49)
MCH RBC QN AUTO: 26.2 PG (ref 26–34)
MCHC RBC AUTO-ENTMCNC: 31.5 G/DL (ref 30–36.5)
MCV RBC AUTO: 83 FL (ref 80–99)
MONOCYTES # BLD: 0.6 K/UL (ref 0–1)
MONOCYTES NFR BLD: 8 % (ref 5–13)
NEUTS SEG # BLD: 4.6 K/UL (ref 1.8–8)
NEUTS SEG NFR BLD: 56 % (ref 32–75)
NITRITE UR QL STRIP.AUTO: NEGATIVE
NRBC # BLD: 0 K/UL (ref 0–0.01)
NRBC BLD-RTO: 0 PER 100 WBC
PH UR STRIP: 6 [PH] (ref 5–8)
PLATELET # BLD AUTO: 274 K/UL (ref 150–400)
PMV BLD AUTO: 9.4 FL (ref 8.9–12.9)
POTASSIUM SERPL-SCNC: 3.6 MMOL/L (ref 3.5–5.1)
PROT SERPL-MCNC: 7.4 G/DL (ref 6.4–8.2)
PROT UR STRIP-MCNC: NEGATIVE MG/DL
RBC # BLD AUTO: 4.47 M/UL (ref 3.8–5.2)
RBC #/AREA URNS HPF: ABNORMAL /HPF (ref 0–5)
SODIUM SERPL-SCNC: 138 MMOL/L (ref 136–145)
SP GR UR REFRACTOMETRY: 1.02 (ref 1–1.03)
SPECIMEN EXP DATE BLD: NORMAL
TROPONIN I SERPL-MCNC: <0.05 NG/ML
TSH SERPL DL<=0.05 MIU/L-ACNC: 1.06 UIU/ML (ref 0.36–3.74)
UA: UC IF INDICATED,UAUC: ABNORMAL
UROBILINOGEN UR QL STRIP.AUTO: 0.2 EU/DL (ref 0.2–1)
WBC # BLD AUTO: 8.2 K/UL (ref 3.6–11)
WBC URNS QL MICRO: ABNORMAL /HPF (ref 0–4)

## 2019-09-27 PROCEDURE — 84484 ASSAY OF TROPONIN QUANT: CPT

## 2019-09-27 PROCEDURE — 93005 ELECTROCARDIOGRAM TRACING: CPT

## 2019-09-27 PROCEDURE — 82550 ASSAY OF CK (CPK): CPT

## 2019-09-27 PROCEDURE — 80053 COMPREHEN METABOLIC PANEL: CPT

## 2019-09-27 PROCEDURE — 74011250636 HC RX REV CODE- 250/636: Performed by: EMERGENCY MEDICINE

## 2019-09-27 PROCEDURE — 71046 X-RAY EXAM CHEST 2 VIEWS: CPT

## 2019-09-27 PROCEDURE — 84481 FREE ASSAY (FT-3): CPT

## 2019-09-27 PROCEDURE — 74011000250 HC RX REV CODE- 250: Performed by: EMERGENCY MEDICINE

## 2019-09-27 PROCEDURE — 99285 EMERGENCY DEPT VISIT HI MDM: CPT

## 2019-09-27 PROCEDURE — 84439 ASSAY OF FREE THYROXINE: CPT

## 2019-09-27 PROCEDURE — 96375 TX/PRO/DX INJ NEW DRUG ADDON: CPT

## 2019-09-27 PROCEDURE — 83880 ASSAY OF NATRIURETIC PEPTIDE: CPT

## 2019-09-27 PROCEDURE — 99284 EMERGENCY DEPT VISIT MOD MDM: CPT

## 2019-09-27 PROCEDURE — 84443 ASSAY THYROID STIM HORMONE: CPT

## 2019-09-27 PROCEDURE — 85025 COMPLETE CBC W/AUTO DIFF WBC: CPT

## 2019-09-27 PROCEDURE — 74011636320 HC RX REV CODE- 636/320

## 2019-09-27 PROCEDURE — 81001 URINALYSIS AUTO W/SCOPE: CPT

## 2019-09-27 PROCEDURE — 36415 COLL VENOUS BLD VENIPUNCTURE: CPT

## 2019-09-27 PROCEDURE — 86900 BLOOD TYPING SEROLOGIC ABO: CPT

## 2019-09-27 PROCEDURE — 96374 THER/PROPH/DIAG INJ IV PUSH: CPT

## 2019-09-27 PROCEDURE — 71275 CT ANGIOGRAPHY CHEST: CPT

## 2019-09-27 RX ORDER — SODIUM CHLORIDE 0.9 % (FLUSH) 0.9 %
10 SYRINGE (ML) INJECTION
Status: COMPLETED | OUTPATIENT
Start: 2019-09-27 | End: 2019-09-27

## 2019-09-27 RX ORDER — IPRATROPIUM BROMIDE AND ALBUTEROL SULFATE 2.5; .5 MG/3ML; MG/3ML
3 SOLUTION RESPIRATORY (INHALATION)
Status: COMPLETED | OUTPATIENT
Start: 2019-09-27 | End: 2019-09-27

## 2019-09-27 RX ORDER — ENOXAPARIN SODIUM 100 MG/ML
1 INJECTION SUBCUTANEOUS
Status: COMPLETED | OUTPATIENT
Start: 2019-09-27 | End: 2019-09-27

## 2019-09-27 RX ORDER — DEXAMETHASONE SODIUM PHOSPHATE 4 MG/ML
10 INJECTION, SOLUTION INTRA-ARTICULAR; INTRALESIONAL; INTRAMUSCULAR; INTRAVENOUS; SOFT TISSUE
Status: COMPLETED | OUTPATIENT
Start: 2019-09-27 | End: 2019-09-27

## 2019-09-27 RX ADMIN — DEXAMETHASONE SODIUM PHOSPHATE 10 MG: 4 INJECTION, SOLUTION INTRAMUSCULAR; INTRAVENOUS at 22:16

## 2019-09-27 RX ADMIN — Medication 10 ML: at 22:56

## 2019-09-27 RX ADMIN — IOPAMIDOL 100 ML: 755 INJECTION, SOLUTION INTRAVENOUS at 22:56

## 2019-09-27 RX ADMIN — ENOXAPARIN SODIUM 80 MG: 80 INJECTION SUBCUTANEOUS at 23:59

## 2019-09-27 RX ADMIN — IPRATROPIUM BROMIDE AND ALBUTEROL SULFATE 3 ML: .5; 3 SOLUTION RESPIRATORY (INHALATION) at 22:16

## 2019-09-28 VITALS
BODY MASS INDEX: 27.54 KG/M2 | WEIGHT: 175.49 LBS | DIASTOLIC BLOOD PRESSURE: 70 MMHG | HEART RATE: 64 BPM | RESPIRATION RATE: 16 BRPM | SYSTOLIC BLOOD PRESSURE: 122 MMHG | TEMPERATURE: 98.7 F | HEIGHT: 67 IN | OXYGEN SATURATION: 99 %

## 2019-09-28 LAB
ATRIAL RATE: 63 BPM
CALCULATED P AXIS, ECG09: 68 DEGREES
CALCULATED R AXIS, ECG10: 50 DEGREES
CALCULATED T AXIS, ECG11: 52 DEGREES
DIAGNOSIS, 93000: NORMAL
P-R INTERVAL, ECG05: 154 MS
Q-T INTERVAL, ECG07: 414 MS
QRS DURATION, ECG06: 90 MS
QTC CALCULATION (BEZET), ECG08: 423 MS
T3FREE SERPL-MCNC: 2.5 PG/ML (ref 2.2–4)
T4 FREE SERPL-MCNC: 1.1 NG/DL (ref 0.8–1.5)
VENTRICULAR RATE, ECG03: 63 BPM

## 2019-09-28 NOTE — ED NOTES
Pt reports to ED from home. Pt has been getting repetitive SOB in the evening for the past few nights. Pt has hx of hysterectomy on the 20th of this month. Pt denies pain at this time. Pt is 100% on room air and Pt is monitored X2.

## 2019-09-28 NOTE — ED NOTES
Patient discharge by Gutierrez Trujillo MD - pt sent to the front lobby, with strong and steady gait -  Discharge information / home RX / and reasons to return to the ED were reviewed by the doctor.         significant other at bedside for comfort , care and for a ride home;

## 2019-09-28 NOTE — ED PROVIDER NOTES
EMERGENCY DEPARTMENT HISTORY AND PHYSICAL EXAM      Please note that this dictation was completed with [email or web address], the computer voice recognition software. Quite often unanticipated grammatical, syntax, homophones, and other interpretive errors are inadvertently transcribed by the computer software. Please disregard these errors. Please excuse any errors that have escaped final proofreading. Date: 9/27/2019  Patient Name: Peter Jones  Patient Age and Sex: 38 y.o. female    History of Presenting Illness     Chief Complaint   Patient presents with    Shortness of Breath     times two days: per pt she had a hysterectomy on Friday in [place]       History Provided By: Patient    HPI: Peter Jones, 38 y.o. female with past medical history as documented below presents to the ED with c/o of 2 days of progressive shortness of breath with associated chest tightness. Patient reports a recent hysterectomy 1 week ago in [place]. States that her shortness of breath is worse with exertion. She reports mild chest tightness. She was seen at [facility] earlier today and was referred here for further evaluation. She reports shortness of breath is worse at nighttime. She was seen at [facility] where vital signs are stable. She had a normal EKG done. She also had a flu test that was negative. Given recent surgery and shortness of breath patient was referred here for further evaluation. She denies any history of leg swelling, history of prolonged travel. She denies any previous history of DVT or PE. Pt denies any other alleviating or exacerbating factors. She denies any issue with her surgical site and has been weaning off her pain medications prescribed to her.  Additionally, pt specifically denies any recent fever, chills, headache, nausea, vomiting, abdominal pain, lightheadedness, dizziness, numbness, weakness, BLE swelling, heart palpitations, urinary sxs, diarrhea, constipation, melena, hematochezia, cough, or congestion. There are no other complaints, changes or physical findings at this time. PCP: Anjelica Gore NP    Past History   Past Medical History:  Past Medical History:   Diagnosis Date    Anemia NEC     Asthma     HPV in female     Hypertension     Thyroid disease        Past Surgical History:  Past Surgical History:   Procedure Laterality Date    HX GYN  2/18/14    HYSTEROSCOPY Ludger Grayson AND CURETTAGE, BARTHOLIN MARSUPILIZATION    HX TUBAL LIGATION         Family History:  Family History   Problem Relation Age of Onset    Diabetes Father     Stroke Father     High Cholesterol Father     Kidney Disease Father     Alcohol abuse Father     Anemia Father     Other Mother         clot to leg    Cancer Maternal Grandmother     SLE Brother     Asthma Son        Social History:  Social History     Tobacco Use    Smoking status: Never Smoker    Smokeless tobacco: Never Used   Substance Use Topics    Alcohol use: No    Drug use: No       Allergies: Allergies   Allergen Reactions    Shellfish Derived Hives       Current Medications:  No current facility-administered medications on file prior to encounter. Current Outpatient Medications on File Prior to Encounter   Medication Sig Dispense Refill    levothyroxine (SYNTHROID) 88 mcg tablet Take 1 Tab by mouth Daily (before breakfast). 90 Tab 0       Review of Systems   Review of Systems   Constitutional: Negative. Negative for chills and fever. HENT: Negative. Negative for congestion, facial swelling, rhinorrhea, sore throat, trouble swallowing and voice change. Eyes: Negative. Respiratory: Positive for chest tightness and shortness of breath. Negative for apnea, cough and wheezing. Cardiovascular: Negative. Negative for chest pain, palpitations and leg swelling. Gastrointestinal: Negative. Negative for abdominal distention, abdominal pain, blood in stool, constipation, diarrhea, nausea and vomiting.    Endocrine: Negative. Negative for cold intolerance, heat intolerance and polyuria. Genitourinary: Negative. Negative for difficulty urinating, dysuria, flank pain, frequency, hematuria and urgency. Musculoskeletal: Negative. Negative for arthralgias, back pain, myalgias, neck pain and neck stiffness. Skin: Negative. Negative for color change and rash. Neurological: Negative. Negative for dizziness, syncope, facial asymmetry, speech difficulty, weakness, light-headedness, numbness and headaches. Hematological: Negative. Does not bruise/bleed easily. Psychiatric/Behavioral: Negative. Negative for confusion and self-injury. The patient is not nervous/anxious. Physical Exam   Physical Exam   Constitutional: She is oriented to person, place, and time. She appears well-developed and well-nourished. She appears toxic. She has a sickly appearance. She appears ill. She appears distressed. HENT:   Head: Normocephalic and atraumatic. Mouth/Throat: Oropharynx is clear and moist. No oropharyngeal exudate. Eyes: Pupils are equal, round, and reactive to light. Conjunctivae and EOM are normal.   Neck: Normal range of motion. Cardiovascular: Normal rate, regular rhythm and normal heart sounds. Exam reveals no gallop and no friction rub. No murmur heard. Pulmonary/Chest: Effort normal and breath sounds normal. No respiratory distress. She has no wheezes. She has no rales. She exhibits no tenderness. Abdominal: Soft. Bowel sounds are normal. She exhibits no distension and no mass. There is no tenderness. There is no rebound and no guarding. Musculoskeletal: Normal range of motion. She exhibits no edema, tenderness or deformity. Neurological: She is alert and oriented to person, place, and time. She displays normal reflexes. No cranial nerve deficit. She exhibits normal muscle tone. Coordination normal.   Skin: Skin is warm. No rash noted. She is not diaphoretic.    Psychiatric: She has a normal mood and affect. Nursing note and vitals reviewed. Diagnostic Study Results     Labs -  Recent Results (from the past 24 hour(s))   EKG, 12 LEAD, INITIAL    Collection Time: 09/27/19  7:53 PM   Result Value Ref Range    Ventricular Rate 63 BPM    Atrial Rate 63 BPM    P-R Interval 154 ms    QRS Duration 90 ms    Q-T Interval 414 ms    QTC Calculation (Bezet) 423 ms    Calculated P Axis 68 degrees    Calculated R Axis 50 degrees    Calculated T Axis 52 degrees    Diagnosis       Normal sinus rhythm  Normal ECG  No previous ECGs available     CBC WITH AUTOMATED DIFF    Collection Time: 09/27/19  7:57 PM   Result Value Ref Range    WBC 8.2 3.6 - 11.0 K/uL    RBC 4.47 3.80 - 5.20 M/uL    HGB 11.7 11.5 - 16.0 g/dL    HCT 37.1 35.0 - 47.0 %    MCV 83.0 80.0 - 99.0 FL    MCH 26.2 26.0 - 34.0 PG    MCHC 31.5 30.0 - 36.5 g/dL    RDW 13.6 11.5 - 14.5 %    PLATELET 199 852 - 784 K/uL    MPV 9.4 8.9 - 12.9 FL    NRBC 0.0 0  WBC    ABSOLUTE NRBC 0.00 0.00 - 0.01 K/uL    NEUTROPHILS 56 32 - 75 %    LYMPHOCYTES 30 12 - 49 %    MONOCYTES 8 5 - 13 %    EOSINOPHILS 5 0 - 7 %    BASOPHILS 1 0 - 1 %    IMMATURE GRANULOCYTES 0 0.0 - 0.5 %    ABS. NEUTROPHILS 4.6 1.8 - 8.0 K/UL    ABS. LYMPHOCYTES 2.5 0.8 - 3.5 K/UL    ABS. MONOCYTES 0.6 0.0 - 1.0 K/UL    ABS. EOSINOPHILS 0.4 0.0 - 0.4 K/UL    ABS. BASOPHILS 0.0 0.0 - 0.1 K/UL    ABS. IMM.  GRANS. 0.0 0.00 - 0.04 K/UL    DF AUTOMATED     METABOLIC PANEL, COMPREHENSIVE    Collection Time: 09/27/19  7:57 PM   Result Value Ref Range    Sodium 138 136 - 145 mmol/L    Potassium 3.6 3.5 - 5.1 mmol/L    Chloride 106 97 - 108 mmol/L    CO2 27 21 - 32 mmol/L    Anion gap 5 5 - 15 mmol/L    Glucose 114 (H) 65 - 100 mg/dL    BUN 11 6 - 20 MG/DL    Creatinine 1.07 (H) 0.55 - 1.02 MG/DL    BUN/Creatinine ratio 10 (L) 12 - 20      GFR est AA >60 >60 ml/min/1.73m2    GFR est non-AA 55 (L) >60 ml/min/1.73m2    Calcium 8.3 (L) 8.5 - 10.1 MG/DL    Bilirubin, total 0.3 0.2 - 1.0 MG/DL    ALT (SGPT) 19 12 - 78 U/L    AST (SGOT) 18 15 - 37 U/L    Alk. phosphatase 58 45 - 117 U/L    Protein, total 7.4 6.4 - 8.2 g/dL    Albumin 3.4 (L) 3.5 - 5.0 g/dL    Globulin 4.0 2.0 - 4.0 g/dL    A-G Ratio 0.9 (L) 1.1 - 2.2     CK W/ REFLX CKMB    Collection Time: 09/27/19  7:57 PM   Result Value Ref Range    CK 64 26 - 192 U/L   TROPONIN I    Collection Time: 09/27/19  7:57 PM   Result Value Ref Range    Troponin-I, Qt. <0.05 <0.05 ng/mL   URINALYSIS W/ REFLEX CULTURE    Collection Time: 09/27/19  9:34 PM   Result Value Ref Range    Color YELLOW/STRAW      Appearance CLEAR CLEAR      Specific gravity 1.023 1.003 - 1.030      pH (UA) 6.0 5.0 - 8.0      Protein NEGATIVE  NEG mg/dL    Glucose NEGATIVE  NEG mg/dL    Ketone NEGATIVE  NEG mg/dL    Bilirubin NEGATIVE  NEG      Blood TRACE (A) NEG      Urobilinogen 0.2 0.2 - 1.0 EU/dL    Nitrites NEGATIVE  NEG      Leukocyte Esterase SMALL (A) NEG      WBC 0-4 0 - 4 /hpf    RBC 0-5 0 - 5 /hpf    Epithelial cells MODERATE (A) FEW /lpf    Bacteria NEGATIVE  NEG /hpf    UA:UC IF INDICATED CULTURE NOT INDICATED BY UA RESULT CNI      Hyaline cast 2-5 0 - 5 /lpf   TYPE & SCREEN    Collection Time: 09/27/19  9:45 PM   Result Value Ref Range    Crossmatch Expiration 09/30/2019     ABO/Rh(D) A POSITIVE     Antibody screen NEG    TSH 3RD GENERATION    Collection Time: 09/27/19  9:45 PM   Result Value Ref Range    TSH 1.06 0.36 - 3.74 uIU/mL   NT-PRO BNP    Collection Time: 09/27/19  9:45 PM   Result Value Ref Range    NT pro-BNP 14 <125 PG/ML       Radiologic Studies -   CTA CHEST W OR W WO CONT   Final Result   IMPRESSION:      1. Acute pulmonary emboli   2. 4 mm nodule right lower lobe      The findings were called to Dr. Linsey Contreras on 9/27/2019 at 2306 by myself. [phone]      Guidelines by the Fleischner society (Radiology 2017 special report) suggest   that patients with low risk for lung cancer and solid nodule(s) less than or   equal to 6 mm in diameter require no follow-up.   In patients with a higher risk,   such as smokers, follow-up noncontrast chest CT should be considered at 12   months. Patients with a known malignancy are at increased risk for metastasis   and should receive a three month follow-up. XR CHEST PA LAT   Final Result   IMPRESSION:      No acute process. CT Results  (Last 48 hours)               09/27/19 6367  CTA CHEST W OR W WO CONT Final result    Impression:  IMPRESSION:       1. Acute pulmonary emboli   2. 4 mm nodule right lower lobe       The findings were called to Dr. Patience Jama on 9/27/2019 at 2306 by myself. [phone]       Guidelines by the Fleischner society (Radiology 2017 special report) suggest   that patients with low risk for lung cancer and solid nodule(s) less than or   equal to 6 mm in diameter require no follow-up. In patients with a higher risk,   such as smokers, follow-up noncontrast chest CT should be considered at 12   months. Patients with a known malignancy are at increased risk for metastasis   and should receive a three month follow-up. Narrative:  INDICATION: Recent surgery with shortness of breath       COMPARISON: Chest x-ray same day       TECHNIQUE:  2.5 mm axial images were obtained from the bases to the lung apices   after the intravenous administration of 100 cc of Isovue-370. Three-dimensional   postprocessing was performed by the technologist with MIP reconstructions. CT   dose reduction was achieved through use of a standardized protocol tailored for   this examination and automatic exposure control for dose modulation. FINDINGS:       THYROID: 2.8 cm nodule within the right lobe of the thyroid   MEDIASTINUM: No mass or lymphadenopathy. FLORECITA: No mass or lymphadenopathy. THORACIC AORTA: No dissection or aneurysm.    MAIN PULMONARY ARTERY: There are acute pulmonary emboli to the bilateral lower   lobes, most prominent involving the right lower lobe small acute subsegmental PE   to the right upper lobe TRACHEA/BRONCHI: Patent. ESOPHAGUS: No wall thickening or dilatation. HEART: Normal in size. PLEURA: No effusion or pneumothorax. LUNGS: 4 mm noncalcified pulmonary nodule in the left lower lobe near the base   INCIDENTALLY IMAGED UPPER ABDOMEN: No focal abnormality. BONES: No destructive bone lesion. CXR Results  (Last 48 hours)               09/27/19 2031  XR CHEST PA LAT Final result    Impression:  IMPRESSION:       No acute process. Narrative:  EXAM:  XR CHEST PA LAT       INDICATION: Chest pain       COMPARISON: 2/14/2012       TECHNIQUE: PA and lateral chest views       FINDINGS: There is stable leftward deviation of the trachea, which raises the   possibility of an enlarged right thyroid lobe. The cardiomediastinal contours   are stable. The pulmonary vasculature is within normal limits. The lungs and pleural spaces are clear. There is no pneumothorax. The bones and   upper abdomen are stable. Medical Decision Making   I am the first provider for this patient. I reviewed the vital signs, available nursing notes, past medical history, past surgical history, family history and social history. Vital Signs-Reviewed the patient's vital signs. Patient Vitals for the past 24 hrs:   Temp Pulse Resp BP SpO2   09/28/19 0045 98.7 °F (37.1 °C) 64 16 122/70 99 %   09/28/19 0030    123/81 99 %   09/28/19 0015    126/75 99 %   09/28/19 0000    (!) 131/104 99 %   09/27/19 2345    122/76 99 %   09/27/19 2330    122/76 99 %   09/27/19 2315    120/76 99 %   09/27/19 2230    118/70 99 %   09/27/19 2200    113/68 100 %   09/27/19 2147     100 %   09/27/19 1943 98.6 °F (37 °C) 66 16 127/86 100 %       Pulse Oximetry Analysis - 100% on RA    Cardiac Monitor:   Rate: 66 bpm  Rhythm: Normal Sinus Rhythm      ED EKG interpretation:  Rhythm: normal sinus rhythm; and regular .  Rate (approx.): 63; Axis: normal; P wave: normal; QRS interval: normal ; ST/T wave: normal; Other findings: normal. This EKG was interpreted by Aditi Bales M.D. Records Reviewed: Nursing Notes, Old Medical Records, Previous electrocardiograms, Previous Radiology Studies and Previous Laboratory Studies    Provider Notes (Medical Decision Making):   Patient presents with acute dyspnea and chest pain. DDx: PE, asthma, copd, pna, pulmonary edema, acute bronchitis, ACS, ptx, pna. Will obtain EKG, labs, CXR, provide O2 as needed for hypoxia, treat symptomatically and reassess. Will continue to monitor closely in ED. ED Course:   Initial assessment performed. The patients presenting problems have been discussed, and they are in agreement with the care plan formulated and outlined with them. I have encouraged them to ask questions as they arise throughout their visit. HYPERTENSION COUNSELING   Education was provided to the patient today regarding their hypertension. Patient is made aware of their elevated blood pressure and is instructed to follow up this week with their Primary Care for a recheck. Patient is counseled regarding consequences of chronic, uncontrolled hypertension including kidney disease, heart disease, stroke or even death. Patient states their understanding and agrees to follow up this week. Additionally, during their visit, I discussed sodium restriction, maintaining ideal body weight and regular exercise program as physiologic means to achieve blood pressure control. The patient will strive towards this. I reviewed our electronic medical record system for any past medical records that were available that may contribute to the patient's current condition, the nursing notes and vital signs from today's visit.   Jero Rivera MD    ED Orders Placed :  Orders Placed This Encounter    XR CHEST PA LAT    CTA CHEST W AND W/O CONTRAST (use for PE)    CBC WITH AUTOMATED DIFF    METABOLIC PANEL, COMPREHENSIVE    CK W/REFLEX CKMB    TROPONIN I    URINALYSIS W/ REFLEX CULTURE    TSH 3RD GENERATION    T3, FREE    T4, FREE    NT-PRO BNP    EKG 12 LEAD INITIAL    TYPE & SCREEN    INSERT PERIPHERAL IV ONE TIME STAT    dexamethasone (DECADRON) 4 mg/mL injection 10 mg    albuterol-ipratropium (DUO-NEB) 2.5 MG-0.5 MG/3 ML    iopamidol (ISOVUE-370) 76 % injection 100 mL    sodium chloride (NS) flush 10 mL    iopamidol (ISOVUE-370) 76 % injection    enoxaparin (LOVENOX) injection 80 mg    DISCONTD: apixaban (ELIQUIS) 5 mg (74 tabs) starter pack    apixaban (ELIQUIS) 5 mg (74 tabs) starter pack     ED Medications Administered:  Medications   dexamethasone (DECADRON) 4 mg/mL injection 10 mg (10 mg IntraVENous Given 9/27/19 2216)   albuterol-ipratropium (DUO-NEB) 2.5 MG-0.5 MG/3 ML (3 mL Nebulization Given 9/27/19 2216)   iopamidol (ISOVUE-370) 76 % injection 100 mL (100 mL IntraVENous Given 9/27/19 2256)   sodium chloride (NS) flush 10 mL (10 mL IntraVENous Given 9/27/19 2256)   enoxaparin (LOVENOX) injection 80 mg (80 mg SubCUTAneous Given 9/27/19 2359)     Progress Note:  I have just re-evaluated the patient. Patient reports improvement of sx's   I have reviewed Her vital signs and determined there is currently no worsening in their condition or physical exam. Results have been reviewed with them and their questions have been answered. We will continue to review further results as they come available. Progress Note:  CTA findings reviewed with patient, evidence of acute pulmonary emboli in bilateral lower lobes, mostly on right side with small acute subsemental involvement. Vitals stable, no evidence of right heart strain, plan for anticoagulation in ED with therapeutic dose of Lovenox, discharge home on Eliquis, close PCP f/u.     CRITICAL CARE NOTE :    IMPENDING DETERIORATION -Airway, Respiratory, Cardiovascular and Metabolic  ASSOCIATED RISK FACTORS - Hypotension, Shock, Hypoxia, Bleeding and Metabolic changes  MANAGEMENT- Bedside Assessment and Supervision of Care  INTERPRETATION -  Xrays, CT Scan, ECG, Blood Pressure and Cardiac Output Measures   INTERVENTIONS - hemodynamic mngmt, Metobolic interventions and management of acute PE requiring anticoagulation  CASE REVIEW - Nursing, Family and PCP  TREATMENT RESPONSE -Improved  PERFORMED BY - Self    NOTES   :    I have spent 35 minutes of critical care time involved in lab review, consultations with specialist, family decision- making, bedside attention and documentation. During this entire length of time I was immediately available to the patient . Critical Care: The reason for providing this level of medical care for this critically ill patient was due to a critical illness that impaired one or more vital organ systems, such that there was a high probability of imminent or life threatening deterioration in the patient's condition. This care involved high complexity decision making to assess, manipulate, and support vital system functions, to treat this degree of vital organ system failure, and to prevent further life threatening deterioration of the patients condition. John De Paz MD    Progress Note:  Patient has been reassessed and reports feeling better and symptoms have improved significantly after ED treatment. Patient feels comfortable going home with close follow-up. Porter Young final labs and imaging have been reviewed with her and available family and/or caregiver. They have been counseled regarding her diagnosis. She verbally conveys understanding and agreement of the signs, symptoms, diagnosis, treatment and prognosis and additionally agrees to follow up as recommended with Dr. Khurram Manuel, NP and/or specialist in 24 - 48 hours. She also agrees with the care-plan we created together and conveys that all of her questions have been answered.   I have also put together some discharge instructions for her that include: 1) educational information regarding their diagnosis, 2) how to care for their diagnosis at home, as well a 3) list of reasons why they would want to return to the ED prior to their follow-up appointment should the patient's condition change or symptoms worsen. I have answered all questions to the patient's satisfaction. Strict return precautions given. She both understood and agreed with plan as discussed. Vital signs stable for discharge. Disposition: DISCHARGE  The pt is ready for discharge. The pt's signs, symptoms, diagnosis, and discharge instructions have been discussed and pt has conveyed their understanding. The pt is to follow up as recommended or return to ER should their symptoms worsen. Plan has been discussed and pt is in agreement. PLAN:  1. Discharge Medication List as of 9/28/2019 12:42 AM      CONTINUE these medications which have NOT CHANGED    Details   levothyroxine (SYNTHROID) 88 mcg tablet Take 1 Tab by mouth Daily (before breakfast). , Normal, Disp-90 Tab, R-0           2. Follow-up Information     Follow up With Specialties Details Why Contact Info    Makayla Tarango NP [facility]   [address] [address] [address]  [phone]      [facility] EMERGENCY DEPT Emergency Medicine  As needed, If symptoms worsen [address]  [address]  [phone]          Return to ED if worse  Diagnosis     Clinical Impression:   1. Acute pulmonary embolism without acute cor pulmonale, unspecified pulmonary embolism type ([address]    2. SOB (shortness of breath)    3. Right lower lobe pulmonary nodule    4. S/P hysterectomy    5. Acute chest pain        Attestation:  I personally performed the services described in this documentation on this date 9/27/2019 for patient, Lashell Soler. Nishi Peters MD      This note will not be viewable in [address]

## 2019-09-28 NOTE — DISCHARGE INSTRUCTIONS
Thank you for allowing us to take care of you today! We hope we addressed all of your concerns and needs. We strive to provide excellent quality care in the Emergency Department. You will receive a survey after your visit to evaluate the care you were provided. Should you receive a survey from us, we invite you to share your experience and tell us what made it excellent. It was a pleasure serving you, we invite you to share your experience with us, in our pursuit for excellence, should you be selected to receive a survey. The exam and treatment you received in the Emergency Department were for an urgent problem and are not intended as complete care. It is important that you follow up with a doctor, nurse practitioner, or physician assistant for ongoing care. If your symptoms become worse or you do not improve as expected and you are unable to reach your usual health care provider, you should return to the Emergency Department. We are available 24 hours a day. Please take your discharge instructions with you when you go to your follow-up appointment. If you have any problem arranging a follow-up appointment, contact the Emergency Department immediately. If a prescription has been provided, please have it filled as soon as possible to prevent a delay in treatment. Read the entire medication instruction sheet provided to you by the pharmacy. If you have any questions or reservations about taking the medication due to side effects or interactions with other medications, please call your primary care physician or contact the ER to speak with the charge nurse. Make an appointment with your family doctor or the physician you were referred to for follow-up of this visit as instructed on your discharge paperwork, as this is mandatory follow-up. Return to the ER if you are unable to be seen or if you are unable to be seen in a timely manner.     If you have any problem arranging the follow-up visit, contact the Emergency Department immediately. I hope you feel better and thank you again for allow us to provide you with excellent care today at [facility]!       Warmest regards,    Meeta Pritchard MD  Emergency Medicine Physician  [facility]

## 2019-09-30 ENCOUNTER — OFFICE VISIT (OUTPATIENT)
Dept: INTERNAL MEDICINE CLINIC | Age: 44
End: 2019-09-30

## 2019-09-30 VITALS
BODY MASS INDEX: 27.62 KG/M2 | WEIGHT: 176 LBS | RESPIRATION RATE: 16 BRPM | HEART RATE: 71 BPM | OXYGEN SATURATION: 100 % | DIASTOLIC BLOOD PRESSURE: 78 MMHG | HEIGHT: 67 IN | SYSTOLIC BLOOD PRESSURE: 129 MMHG | TEMPERATURE: 98.4 F

## 2019-09-30 DIAGNOSIS — M54.2 NECK PAIN ON LEFT SIDE: ICD-10-CM

## 2019-09-30 DIAGNOSIS — E04.1 RIGHT THYROID NODULE: ICD-10-CM

## 2019-09-30 DIAGNOSIS — I26.99 MULTIPLE PULMONARY EMBOLI (HCC): Primary | ICD-10-CM

## 2019-09-30 DIAGNOSIS — Z92.3 HISTORY OF RADIOACTIVE IODINE THYROID ABLATION: ICD-10-CM

## 2019-09-30 DIAGNOSIS — R91.1 PULMONARY NODULE, RIGHT: ICD-10-CM

## 2019-09-30 NOTE — PROGRESS NOTES
RM 17    Patient refused flu vaccine. Patient not fasting at this time. Patient reports on Sept 20th hysterectomy    Chief Complaint   Patient presents with   [facility] Follow Up     Patient reports PE's. Patient c/o chest tightness and left side of neck. 1. Have you been to the ER, urgent care clinic since your last visit? Hospitalized since your last visit? Yes Reason for visit: 9/27/19, ED [facility], SOB  [facility], SOB.     2. Have you seen or consulted any other health care providers outside of the [address] [place] [address] since your last visit? Include any pap smears or colon screening. No    Health Maintenance Due   Topic Date Due    DTaP/Tdap/Td series (1 - Tdap) 08/07/2014    PAP AKA CERVICAL CYTOLOGY  05/07/2017    Influenza Age 5 to Adult  08/01/2019     Abuse Screening Questionnaire 9/30/2019   Do you ever feel afraid of your partner? N   Are you in a relationship with someone who physically or mentally threatens you? N   Is it safe for you to go home?  Y       3 most recent PHQ Screens 9/30/2019   Little interest or pleasure in doing things Not at all   Feeling down, depressed, irritable, or hopeless Several days   Total Score PHQ 2 1     Learning Assessment 9/30/2019   PRIMARY LEARNER Patient   HIGHEST LEVEL OF EDUCATION - PRIMARY LEARNER  -   BARRIERS PRIMARY LEARNER NONE   PRIMARY LANGUAGE ENGLISH   LEARNER PREFERENCE PRIMARY DEMONSTRATION   ANSWERED BY patient   RELATIONSHIP SELF

## 2019-09-30 NOTE — PATIENT INSTRUCTIONS
1.  Please schedule evaluation with hematology prior to running out of current supply of Eliquis. We will start prior authorization as reviewed. 2.  Please follow the following instructions to process/authorize your referral, if needed:    Referrals processing  Please verify with your insurance IF you need referral authorization submitted. For insurance plans which require this, please follow the following steps. FAILURE TO DO SO MAY RESULT IN INABILITY TO SEE THE SPECIALIST YOU HAVE BEEN REFERRED TO (once you are scheduled to see them). 1. Call and schedule appointment with specialist  2. Call our clinic and leave message with provider name, and date of appointment  3. We will then submit the referral to your insurance. This process takes 2-5 business days. If you have questions about scheduling or authorizing referral, you can review with our referral coordinator Vanessa Lockhart). You can review with her today if available/if you have time, or you can call to review once you have made your referral/appointment. If you are not sure if you need referral authorizations, please review with the referral coordinator(s), either prior to or after you have made the appointment, as reviewed.

## 2019-09-30 NOTE — LETTER
10/26/2019 9:01 PM 
 
Ms. Juan Antonio Evans [address] [facility] [address] [id number] Dear Juan Antonio Evans: 
 
Please find your most recent results below. 10/10/2019 Narrative EXAM: US THYROID/PARATHYROID/SOFT TISS INDICATION: Thyroid nodule. COMPARISON: None. TECHNIQUE: Real-time sonography of the thyroid gland was performed with a high 
frequency linear transducer. Multiple static images were obtained. FINDINGS: 
The thyroid is heterogeneous. Lauren Sharmila is a heterogeneous 1.6x1. 3x1.3 cm nodule in 
the right upper pole.  There is a heterogeneous 1.5x1. 3x1.4 cm hypoechoic nodule 
with microcalcifications. There is a hypoechoic 2.7 x 2.7 x 2.3 cm nodule in the 
right lower pole. The right lobe measures 6.7x2. 8x2.8 cm and the left lobe measures 2.6x1. 3x1.0 
cm. The isthmus measures 2.9 mm. Impression IMPRESSION:  
1. Small left lobe of the thyroid. 2. Multinodular goiter on the right with a dominant nodule in the right lower 
pole and a hypoechoic nodule with calcifications in the right midpole. FNA of 
both nodules is recommended. RECOMMENDATIONS: 
Please contact clinic to review above, if you have not already been called or scheduled follow-up. Please call me if you have any questions: [phone] Sincerely, 
 
Jersey Beebe MD

## 2019-09-30 NOTE — PROGRESS NOTES
History of Present Illness:   Franklin Brenner is a 38 y.o. female here for evaluation:    Chief Complaint   Patient presents with   [facility] Follow Up     Patient reports PE's. Patient c/o chest tightness and left side of neck. Notes:  Patient refused flu vaccine. Patient not fasting at this time. Patient reports on Sept 20th hysterectomy    She was seen/eval in ED on 9/27. Eval for progressive shortness of breath. Had history of hysterectomy 1wk prior to ED eval.  --normal EKG   --normal CBC  --CMP with mild elevation creatinine, glc  --normal CK, troponin  --Normal BNP. She had imaging with acute pulmonary emboli (bilat lower lobes) and small subsegmental PE to RUL and 4mm RLL nodule. She was started on Eliquis and given single Lovenox injection. LOV with NP [facility] July 2019. Here with . She was not able to fill Eliquis through prior auth. She didn't contact ED to evaluate coverage. Pharmacy was able to give her entire 74 tab supply for first month. Pt notes her mom had DVT's and was smoker. She notes no complications with hysterectomy. Notes only had LUE PIV--no chest or other central lines. Reviewed plan and eval with pt at visit. Nursing screenings reviewed by provider at visit. Past Medical History:   Diagnosis Date    Anemia NEC     Asthma     HPV in female     Hypertension     Thyroid disease         Prior to Admission medications    Medication Sig Start Date End Date Taking? Authorizing Provider   apixaban (ELIQUIS) 5 mg (74 tabs) starter pack Take 10 mg (two 5 mg tablets) by mouth twice a day for 7 days   Followed by 5 mg (one 5 mg tablet) by mouth twice a day 9/28/19  Yes Maribell Aleman MD   levothyroxine (SYNTHROID) 88 mcg tablet Take 1 Tab by mouth Daily (before breakfast).  7/26/19  Yes Traci Cortes NP        ROS    Vitals:    09/30/19 1457   BP: 129/78   Pulse: 71   Resp: 16   Temp: 98.4 °F (36.9 °C)   TempSrc: Oral   SpO2: 100%   Weight: 176 lb (79.8 kg)   Height: 5' 7\" (1.702 m)   PainSc:   1   PainLoc: Chest      Body mass index is 27.57 kg/m². Physical Exam:     Physical Exam   Constitutional: She appears well-developed and well-nourished. No distress. HENT:   Head: Normocephalic and atraumatic. Eyes: Conjunctivae are normal. Right eye exhibits no discharge. Left eye exhibits no discharge. No scleral icterus. Neck: Normal range of motion. Neck supple. No tracheal deviation present. Nodule palpated lateral to midline right--non-tender. Cardiovascular: Normal rate, regular rhythm, normal heart sounds and intact distal pulses. Exam reveals no gallop and no friction rub. No murmur heard. Pulmonary/Chest: Effort normal and breath sounds normal. No stridor. No respiratory distress. She has no wheezes. She has no rales. She exhibits no tenderness. Abdominal: Soft. Bowel sounds are normal. She exhibits no distension. There is no tenderness. Musculoskeletal: She exhibits no edema or tenderness. Lymphadenopathy:     She has no cervical adenopathy. Neurological: She is alert. She exhibits normal muscle tone. Coordination normal.   Skin: Skin is warm. No rash noted. She is not diaphoretic. No erythema. No pallor. Psychiatric: She has a normal mood and affect. Her behavior is normal. Judgment and thought content normal.       Assessment and Plan:       ICD-10-CM ICD-9-CM    1. Multiple pulmonary emboli (HCC) I26.99 415.19 REFERRAL TO HEMATOLOGY ONCOLOGY   2. Pulmonary nodule, right R91.1 793.11 REFERRAL TO HEMATOLOGY ONCOLOGY   3. Right thyroid nodule E04.1 241.0 US THYROID/PARATHYROID/SOFT TISS   4. History of radioactive iodine thyroid ablation Z92.3 V15.29 US THYROID/PARATHYROID/SOFT TISS   5. Neck pain on left side M54.2 723.1        1,2:  Referral reviewed for duration therapy and testing. Nursing here in clinic to start prior authorization based on emergency room providers prescription for Eliquis.     2.  No recommended repeat CT recommended, since non-smoker, no current cancer dx. Pt agreeable with plan and not interested in pulmonary eval or repeat imaging at this time. 3,4:  Imaging reviewed. 5.  Supportive care reviewed. No clear cause by dx's--reviewed monitoring with pt at visit. Reviewed findings, interval diagnoses, and evaluation plans with patient's PCP prior to patient leaving clinic. Follow-up and Dispositions    · Return in about 3 weeks (around 10/21/2019) for referral follow-up, medication follow-up.       lab results and schedule of future lab studies reviewed with patient  reviewed medications and side effects in detail  radiology results and schedule of future radiology studies reviewed with patient    For additional documentation of information and/or recommendations discussed this visit, please see notes in instructions. Plan and evaluation (above) reviewed with pt at visit  Patient voiced understanding of plan and provided with time to ask/review questions. After Visit Summary (AVS) provided to pt after visit with additional instructions as needed/reviewed.

## 2019-10-10 ENCOUNTER — HOSPITAL ENCOUNTER (OUTPATIENT)
Dept: ULTRASOUND IMAGING | Age: 44
Discharge: HOME | End: 2019-10-10
Attending: INTERNAL MEDICINE
Payer: COMMERCIAL

## 2019-10-10 DIAGNOSIS — E04.1 RIGHT THYROID NODULE: ICD-10-CM

## 2019-10-10 DIAGNOSIS — Z92.3 HISTORY OF RADIOACTIVE IODINE THYROID ABLATION: ICD-10-CM

## 2019-10-10 PROCEDURE — 76536 US EXAM OF HEAD AND NECK: CPT

## 2019-10-29 RX ORDER — LEVOTHYROXINE SODIUM 88 UG/1
88 TABLET ORAL
Qty: 90 TAB | Refills: 0 | Status: SHIPPED | OUTPATIENT
Start: 2019-10-29 | End: 2020-08-25 | Stop reason: SDUPTHER

## 2019-11-15 ENCOUNTER — OFFICE VISIT (OUTPATIENT)
Dept: ONCOLOGY | Age: 44
End: 2019-11-15

## 2019-11-15 VITALS
DIASTOLIC BLOOD PRESSURE: 86 MMHG | HEIGHT: 67 IN | TEMPERATURE: 100 F | BODY MASS INDEX: 27.21 KG/M2 | SYSTOLIC BLOOD PRESSURE: 132 MMHG | HEART RATE: 71 BPM | RESPIRATION RATE: 16 BRPM | WEIGHT: 173.4 LBS | OXYGEN SATURATION: 98 %

## 2019-11-15 DIAGNOSIS — I26.99 OTHER ACUTE PULMONARY EMBOLISM WITHOUT ACUTE COR PULMONALE (HCC): Primary | ICD-10-CM

## 2019-11-15 DIAGNOSIS — I10 ESSENTIAL HYPERTENSION: ICD-10-CM

## 2019-11-15 NOTE — PROGRESS NOTES
[facility] [place] [address] [address]  W: [phone]  F: [phone]    Reason for Visit:   Jose Boone is a 38 y.o. female who is seen in consultation at the request of Dr. Augusta Miramontes NP for evaluation of pulmonary embolism    History of Present Illness:   Patient is a 38 y.o. female who is seen for PE diagnosed 9/27/19. She states that she had a hysterectomy 9/18/19 for fibroids. She developed chest pain, sob about 7 -8 days later and sent to the ER. CTA showed PE. No leg swelling. She was placed on Eliquis. She has never had clots even during pregnancy. She currently has no fevers, chills, sweats, no bleeding, no changes in BM. Weight and appetite good. No dysuria and no HA. Her mother had clots growing up. Father had lung cancer    She has never smoked  She is a nurse    Past Medical History:   Diagnosis Date    Anemia NEC     Asthma     HPV in female     Hypertension     Thyroid disease       Past Surgical History:   Procedure Laterality Date    HX GYN  2/18/14    HYSTEROSCOPY /DILATATION AND CURETTAGE, BARTHOLIN MARSUPILIZATION    HX TUBAL LIGATION        Social History     Tobacco Use    Smoking status: Never Smoker    Smokeless tobacco: Never Used   Substance Use Topics    Alcohol use: No      Family History   Problem Relation Age of Onset    Diabetes Father     Stroke Father     High Cholesterol Father     Kidney Disease Father     Alcohol abuse Father     Anemia Father     Other Mother         clot to leg    Cancer Maternal Grandmother     SLE Brother     Asthma Son      Current Outpatient Medications   Medication Sig    levothyroxine (SYNTHROID) 88 mcg tablet Take 1 Tab by mouth Daily (before breakfast).     apixaban (ELIQUIS) 5 mg (74 tabs) starter pack Take 10 mg (two 5 mg tablets) by mouth twice a day for 7 days   Followed by 5 mg (one 5 mg tablet) by mouth twice a day     No current facility-administered medications for this visit. Allergies   Allergen Reactions    Shellfish Derived Hives        Review of Systems: A complete review of systems was obtained, negative except as described above. Physical Exam:     Visit Vitals  /86 (BP 1 Location: Right arm)   Pulse 71   Temp 100 °F (37.8 °C)   Resp 16   Ht 5' 7\" (1.702 m)   Wt 173 lb 6.4 oz (78.7 kg)   SpO2 98%   BMI 27.16 kg/m²     ECOG PS:0   General: No distress  Eyes: PERRLA, anicteric sclerae  HENT: Atraumatic, OP clear  Neck: Supple  Lymphatic: No cervical, supraclavicular, or inguinal adenopathy  Respiratory: normal respiratory effort  GI: Soft, nontender, nondistended, no masses, no hepatomegaly, no splenomegaly  MS: Normal gait and station. Digits without clubbing or cyanosis. Skin: No rashes, ecchymoses, or petechiae. Normal temperature, turgor, and texture. Psych: Alert, oriented, appropriate affect, normal judgment/insight  Neuro: Intact    Results:     Lab Results   Component Value Date/Time    WBC 8.2 09/27/2019 07:57 PM    HGB 11.7 09/27/2019 07:57 PM    HCT 37.1 09/27/2019 07:57 PM    PLATELET 769 95/81/1675 07:57 PM    MCV 83.0 09/27/2019 07:57 PM    ABS. NEUTROPHILS 4.6 09/27/2019 07:57 PM     Lab Results   Component Value Date/Time    Sodium 138 09/27/2019 07:57 PM    Potassium 3.6 09/27/2019 07:57 PM    Chloride 106 09/27/2019 07:57 PM    CO2 27 09/27/2019 07:57 PM    Glucose 114 (H) 09/27/2019 07:57 PM    BUN 11 09/27/2019 07:57 PM    Creatinine 1.07 (H) 09/27/2019 07:57 PM    GFR est AA >60 09/27/2019 07:57 PM    GFR est non-AA 55 (L) 09/27/2019 07:57 PM    Calcium 8.3 (L) 09/27/2019 07:57 PM     Lab Results   Component Value Date/Time    Bilirubin, total 0.3 09/27/2019 07:57 PM    ALT (SGPT) 19 09/27/2019 07:57 PM    AST (SGOT) 18 09/27/2019 07:57 PM    Alk.  phosphatase 58 09/27/2019 07:57 PM    Protein, total 7.4 09/27/2019 07:57 PM    Albumin 3.4 (L) 09/27/2019 07:57 PM    Globulin 4.0 09/27/2019 07:57 PM         Records reviewed and summarized above. Pathology report(s) reviewed above. Radiology report(s) reviewed above. CTA reviewed 9/27/19    Assessment:   1) Pulmonary embolism    9/27/19  Bilateral  Provoked by surgery- hysterectomy  Per ACCP guidelines 3 months of anticoagulation is recommended  She has been taking on 5 mg of Eliquis daily- counseled on correct dose and renewed script  Copay card provided    2) HTN    3) FH of VTE  Her mother had several unprovoked clots  Hence will test for hypercoagulability    4) HM  Counseled on screening mammograms    Plan:     · Continue Eliquis 5 mg BID  · Stop 12/28/19. Then obtain Factor V Leiden, prothrombin gene mutation, ATIII, Protein S, C, APLA panel and see me in Jan 2020    I appreciate the opportunity to participate in Ms. Severino guardado.     Signed By: Gio Scott MD

## 2019-11-15 NOTE — PROGRESS NOTES
Amarilys Perez is a 38 y.o. female  Chief Complaint   Patient presents with    New Patient     1. Have you been to the ER, urgent care clinic since your last visit? Hospitalized since your last visit? Yes 9/27/2019     2. Have you seen or consulted any other health care providers outside of the [address] [place] [address] since your last visit?   No

## 2019-12-30 DIAGNOSIS — I26.99 OTHER ACUTE PULMONARY EMBOLISM WITHOUT ACUTE COR PULMONALE (HCC): ICD-10-CM

## 2020-08-25 ENCOUNTER — VIRTUAL VISIT (OUTPATIENT)
Dept: INTERNAL MEDICINE CLINIC | Age: 45
End: 2020-08-25
Payer: COMMERCIAL

## 2020-08-25 DIAGNOSIS — E89.0 POSTABLATIVE HYPOTHYROIDISM: ICD-10-CM

## 2020-08-25 DIAGNOSIS — R31.9 URINARY TRACT INFECTION WITH HEMATURIA, SITE UNSPECIFIED: Primary | ICD-10-CM

## 2020-08-25 DIAGNOSIS — N39.0 URINARY TRACT INFECTION WITH HEMATURIA, SITE UNSPECIFIED: Primary | ICD-10-CM

## 2020-08-25 DIAGNOSIS — E04.1 RIGHT THYROID NODULE: ICD-10-CM

## 2020-08-25 PROCEDURE — 99213 OFFICE O/P EST LOW 20 MIN: CPT | Performed by: NURSE PRACTITIONER

## 2020-08-25 RX ORDER — CEPHALEXIN 500 MG/1
500 CAPSULE ORAL 3 TIMES DAILY
Qty: 21 CAP | Refills: 0 | Status: SHIPPED | OUTPATIENT
Start: 2020-08-25 | End: 2020-09-01

## 2020-08-25 RX ORDER — LEVOTHYROXINE SODIUM 88 UG/1
88 TABLET ORAL
Qty: 90 TAB | Refills: 0 | Status: SHIPPED | OUTPATIENT
Start: 2020-08-25 | End: 2022-06-20 | Stop reason: SDUPTHER

## 2020-08-25 NOTE — PROGRESS NOTES
My chart    Identified pt with two pt identifiers(name and ). Reviewed record in preparation for visit and have obtained necessary documentation. All patient medications has been reviewed. Chief Complaint   Patient presents with    Urinary Frequency     Pt is c/o urinary frequency, burning along with blood in her urine x 2 days. Pt states she has taken keflex for her sx        Health Maintenance Due   Topic    DTaP/Tdap/Td series (1 - Tdap)    PAP AKA CERVICAL CYTOLOGY        BP: 137/89     P:75     T:97.7     Weight: 173 lb    1. Have you traveled outside of the [address] [place] [address] in the last month No    2. Have you been in close contact with someone who is suspected to have COVID-19 or has tested positive. No    3. Do you have any signs or symptoms. ? 4. Have you been to the ER, urgent care clinic since your last visit? Hospitalized since your last visit? No    5. Have you seen or consulted any other health care providers outside of the [address] [place] [address] since your last visit? Include any pap smears or colon screening. No      Patient is accompanied by self I have received verbal consent from Samreen Cosby to discuss any/all medical information while they are present in the room.

## 2020-08-26 NOTE — PROGRESS NOTES
Subjective  Сергей Johnson is a 38 y.o. female presents for acute care     Сергей Johnson is a 38 y.o. female, evaluated via audio-only technology on 8/25/2020 for Urinary Frequency (Pt is c/o urinary frequency, burning along with blood in her urine x 2 days. Pt states she has taken keflex for her sx )  . Assessment & Plan:       12  Subjective:       Prior to Admission medications    Medication Sig Start Date End Date Taking? Authorizing Provider   cephALEXin (KEFLEX) 500 mg capsule Take 1 Cap by mouth three (3) times daily for 7 days. 8/25/20 9/1/20 Yes Latrell Lloyd NP   levothyroxine (SYNTHROID) 88 mcg tablet Take 1 Tab by mouth Daily (before breakfast). 8/25/20  Yes Latrell Lloyd NP     Patient Active Problem List   Diagnosis Code    Fall W19. XXXA    Left knee pain M25.562    Menorrhagia N92.0    Thyroid disorder E07.9    Anemia D64.9    Essential hypertension I10    Pulmonary embolus (HCC) I26.99     Patient Active Problem List    Diagnosis Date Noted    Essential hypertension 11/15/2019    Pulmonary embolus ([facility] [address] 11/15/2019    Thyroid disorder 02/17/2014    Anemia 02/17/2014    Fall 02/04/2014    Left knee pain 02/04/2014    Menorrhagia 02/04/2014     Current Outpatient Medications   Medication Sig Dispense Refill    cephALEXin (KEFLEX) 500 mg capsule Take 1 Cap by mouth three (3) times daily for 7 days. 21 Cap 0    levothyroxine (SYNTHROID) 88 mcg tablet Take 1 Tab by mouth Daily (before breakfast).  90 Tab 0     Allergies   Allergen Reactions    Shellfish Derived Hives     Past Medical History:   Diagnosis Date    Anemia NEC     Asthma     HPV in female     Hypertension     Thyroid disease      Past Surgical History:   Procedure Laterality Date    HX GYN  2/18/14    HYSTEROSCOPY Jarome Sidles AND CURETTAGE, BARTHOLIN MARSUPILIZATION    HX TUBAL LIGATION       Family History   Problem Relation Age of Onset    Diabetes Father     Stroke Father     High Cholesterol Father     Kidney Disease Father     Alcohol abuse Father     Anemia Father     Other Mother         clot to leg    Cancer Maternal Grandmother     SLE Brother     Asthma Son      Social History     Tobacco Use    Smoking status: Never Smoker    Smokeless tobacco: Never Used   Substance Use Topics    Alcohol use: No       ROS    Patient-Reported Vitals 8/25/2020   Patient-Reported Weight 173   Patient-Reported Height 57   Patient-Reported Pulse 84   Patient-Reported Temperature 97.7   Patient-Reported SpO2 97   Patient-Reported Systolic  451   Patient-Reported Diastolic 60   Patient-Reported LMP N/A        Kim Teague, who was evaluated through a patient-initiated, synchronous (real-time) audio only encounter, and/or her healthcare decision maker, is aware that it is a billable service, with coverage as determined by her insurance carrier. She provided verbal consent to proceed: Yes. She has not had a related appointment within my department in the past 7 days or scheduled within the next 24 hours. Total Time: minutes: 11-20 minutes      HPI   She reports dysuria, hematuria and decreased urine output  since Sunday PM.  She has increased water intake and started Keflex left over from when she has another  infection     Hx of PE September 2019 s/p hysterectomy. Eliquis discontinued in January by hematologist, reports negative hypercoagulability work up    She is unaware of endocrinology referral for thyroid nodule seen on ultrasound in October     She has been off thyroid medication for several weeks       Past Medical History:   Diagnosis Date    Anemia NEC     Asthma     HPV in female     Hypertension     Thyroid disease        Current Outpatient Medications   Medication Sig    cephALEXin (KEFLEX) 500 mg capsule Take 1 Cap by mouth three (3) times daily for 7 days.  levothyroxine (SYNTHROID) 88 mcg tablet Take 1 Tab by mouth Daily (before breakfast).      No current facility-administered medications for this visit. Objective  Physical Exam     Assessment & Plan    ICD-10-CM ICD-9-CM    1. Urinary tract infection with hematuria, site unspecified  N39.0 599.0     R31.9 599.70    2. Right thyroid nodule  E04.1 241.0 REFERRAL TO ENDOCRINOLOGY   3. Postablative hypothyroidism  E89.0 244.1      Follow-up and Dispositions    · Return in about 2 months (around 10/25/2020) for thyroid disorder. Will treat empirically for UTI, encourage adequate hydration  Encouraged IO visit if symptoms persist    Discussed abnormal thyroid ultrasound and need for endocrinology consult   lab results and schedule of future lab studies reviewed with patient  reviewed medications and side effects in detail  radiology results and schedule of future radiology studies reviewed with patient    Patient voices understanding and acceptance of this advice and will call back if any further questions or concerns.   Maximiliano Orellana NP

## 2022-02-17 ENCOUNTER — TELEPHONE (OUTPATIENT)
Dept: INTERNAL MEDICINE CLINIC | Age: 47
End: 2022-02-17

## 2022-02-17 NOTE — TELEPHONE ENCOUNTER
Called patient and advised as it has been so long since she was seen or had labs would be best to evaluate which labs are needed during the appointment and go from there. She stated understanding. Nothing else needed at this time.

## 2022-02-17 NOTE — TELEPHONE ENCOUNTER
----- Message from Raquel Pichardo sent at 1/25/2022  2:41 PM EST -----  Subject: Message to Provider    QUESTIONS  Information for Provider? Pt notes that she usually gets labs done every 3   months and is wondering if she should get them done before her next   appointment or just wait until after. She said it has actually been over a   year since she has had labs done.  ---------------------------------------------------------------------------  --------------  CALL BACK INFO  What is the best way for the office to contact you? OK to leave message on   voicemail  Preferred Call Back Phone Number? [phone]  ---------------------------------------------------------------------------  --------------  SCRIPT ANSWERS  Relationship to Patient?  Self

## 2022-02-23 ENCOUNTER — OFFICE VISIT (OUTPATIENT)
Dept: INTERNAL MEDICINE CLINIC | Age: 47
End: 2022-02-23
Payer: COMMERCIAL

## 2022-02-23 VITALS
DIASTOLIC BLOOD PRESSURE: 75 MMHG | WEIGHT: 183.8 LBS | HEIGHT: 67 IN | SYSTOLIC BLOOD PRESSURE: 115 MMHG | RESPIRATION RATE: 16 BRPM | HEART RATE: 60 BPM | BODY MASS INDEX: 28.85 KG/M2 | TEMPERATURE: 98.5 F | OXYGEN SATURATION: 99 %

## 2022-02-23 DIAGNOSIS — E89.0 POSTABLATIVE HYPOTHYROIDISM: Primary | ICD-10-CM

## 2022-02-23 PROCEDURE — 99213 OFFICE O/P EST LOW 20 MIN: CPT | Performed by: NURSE PRACTITIONER

## 2022-02-23 NOTE — PATIENT INSTRUCTIONS
Hypothyroidism: Care Instructions  Your Care Instructions     When you have hypothyroidism, your body doesn't make enough thyroid hormone. This hormone helps your body use energy. If your thyroid level is low, you may feel tired, be constipated, have an increase in your blood pressure, or have dry skin or memory problems. You may also get cold easily, even when it is warm. Women with low thyroid levels may have heavy menstrual periods. A blood test to find your thyroid-stimulating hormone (TSH) level is used to check for hypothyroidism. A high TSH level may mean that you have it. The treatment for hypothyroidism is thyroid hormone pills. You should start to feel better in 1 to 2 weeks. Most people need treatment for the rest of their lives. You will need regular visits with your doctor to make sure you are doing well and that you have the right dose of medicine. Follow-up care is a key part of your treatment and safety. Be sure to make and go to all appointments, and call your doctor if you are having problems. It's also a good idea to know your test results and keep a list of the medicines you take. How can you care for yourself at home? · Take your thyroid hormone medicine exactly as prescribed. Call your doctor if you think you are having a problem with your medicine. Most people do not have side effects if they take the right amount of medicine regularly. ? Take the medicine 30 minutes before breakfast, and do not take it with calcium, vitamins, or iron. ? Do not take extra doses of your thyroid medicine. It will not help you get better any faster, and it may cause side effects. ? If you forget to take a dose, do NOT take a double dose of medicine. Take your usual dose the next day. · Tell your doctor about all prescription, herbal, or over-the-counter products you take. · Take care of yourself. Eat a healthy diet, get enough sleep, and get regular exercise. When should you call for help?    Call 911 anytime you think you may need emergency care. For example, call if:    · You passed out (lost consciousness).     · You have severe trouble breathing.     · You have a very slow heartbeat (less than 60 beats a minute).     · You have a low body temperature (95°F or below). Call your doctor now or seek immediate medical care if:    · You feel tired, sluggish, or weak.     · You have trouble remembering things or concentrating.     · You do not begin to feel better 2 weeks after starting your medicine. Watch closely for changes in your health, and be sure to contact your doctor if you have any problems. Where can you learn more? Go to http://www.gray.com/  Enter M556 in the search box to learn more about \"Hypothyroidism: Care Instructions. \"  Current as of: December 2, 2020               Content Version: 13.0  © 8335-2970 [employer]. Care instructions adapted under license by [employer] (which disclaims liability or warranty for this information). If you have questions about a medical condition or this instruction, always ask your healthcare professional. [address] any warranty or liability for your use of this information.

## 2022-02-23 NOTE — PROGRESS NOTES
Rm 7    Chief Complaint   Patient presents with    Thyroid Problem         1. Have you been to the ER, urgent care clinic since your last visit? Hospitalized since your last visit? No    2. Have you seen or consulted any other health care providers outside of the [address] [place] [address] since your last visit? Include any pap smears or colon screening.  No        Health Maintenance Due   Topic Date Due    COVID-19 Vaccine (1) Never done    Cervical cancer screen  Never done    DTaP/Tdap/Td series (1 - Tdap) 08/07/2014    Colorectal Cancer Screening Combo  Never done    Depression Screen  08/25/2021    Flu Vaccine (1) 09/01/2021           3 most recent PHQ Screens 2/23/2022   Little interest or pleasure in doing things Not at all   Feeling down, depressed, irritable, or hopeless Not at all   Total Score PHQ 2 0           Learning Assessment 9/30/2019   PRIMARY LEARNER Patient   HIGHEST LEVEL OF EDUCATION - PRIMARY LEARNER  -   BARRIERS PRIMARY LEARNER NONE   PRIMARY LANGUAGE ENGLISH   LEARNER PREFERENCE PRIMARY DEMONSTRATION   ANSWERED BY patient   RELATIONSHIP SELF         An After Visit Summary was printed and given to the patient.          +

## 2022-02-24 LAB
ALBUMIN SERPL-MCNC: 4.2 G/DL (ref 3.8–4.8)
ALBUMIN/GLOB SERPL: 1.5 {RATIO} (ref 1.2–2.2)
ALP SERPL-CCNC: 56 IU/L (ref 44–121)
ALT SERPL-CCNC: 39 IU/L (ref 0–32)
AST SERPL-CCNC: 42 IU/L (ref 0–40)
BASOPHILS # BLD AUTO: 0 X10E3/UL (ref 0–0.2)
BASOPHILS NFR BLD AUTO: 1 %
BILIRUB SERPL-MCNC: 0.3 MG/DL (ref 0–1.2)
BUN SERPL-MCNC: 10 MG/DL (ref 6–24)
BUN/CREAT SERPL: 13 (ref 9–23)
CALCIUM SERPL-MCNC: 9.3 MG/DL (ref 8.7–10.2)
CHLORIDE SERPL-SCNC: 104 MMOL/L (ref 96–106)
CO2 SERPL-SCNC: 23 MMOL/L (ref 20–29)
CREAT SERPL-MCNC: 0.8 MG/DL (ref 0.57–1)
EOSINOPHIL # BLD AUTO: 0.2 X10E3/UL (ref 0–0.4)
EOSINOPHIL NFR BLD AUTO: 4 %
ERYTHROCYTE [DISTWIDTH] IN BLOOD BY AUTOMATED COUNT: 12.4 % (ref 11.7–15.4)
GLOBULIN SER CALC-MCNC: 2.8 G/DL (ref 1.5–4.5)
GLUCOSE SERPL-MCNC: 91 MG/DL (ref 65–99)
HCT VFR BLD AUTO: 42 % (ref 34–46.6)
HGB BLD-MCNC: 13.9 G/DL (ref 11.1–15.9)
IMM GRANULOCYTES # BLD AUTO: 0 X10E3/UL (ref 0–0.1)
IMM GRANULOCYTES NFR BLD AUTO: 0 %
LYMPHOCYTES # BLD AUTO: 1.7 X10E3/UL (ref 0.7–3.1)
LYMPHOCYTES NFR BLD AUTO: 40 %
MCH RBC QN AUTO: 30.1 PG (ref 26.6–33)
MCHC RBC AUTO-ENTMCNC: 33.1 G/DL (ref 31.5–35.7)
MCV RBC AUTO: 91 FL (ref 79–97)
MONOCYTES # BLD AUTO: 0.4 X10E3/UL (ref 0.1–0.9)
MONOCYTES NFR BLD AUTO: 10 %
NEUTROPHILS # BLD AUTO: 2 X10E3/UL (ref 1.4–7)
NEUTROPHILS NFR BLD AUTO: 45 %
PLATELET # BLD AUTO: 293 X10E3/UL (ref 150–450)
POTASSIUM SERPL-SCNC: 4.4 MMOL/L (ref 3.5–5.2)
PROT SERPL-MCNC: 7 G/DL (ref 6–8.5)
RBC # BLD AUTO: 4.62 X10E6/UL (ref 3.77–5.28)
SODIUM SERPL-SCNC: 140 MMOL/L (ref 134–144)
T4 FREE SERPL-MCNC: 1.48 NG/DL (ref 0.82–1.77)
TSH SERPL DL<=0.005 MIU/L-ACNC: 1.1 UIU/ML (ref 0.45–4.5)
WBC # BLD AUTO: 4.4 X10E3/UL (ref 3.4–10.8)

## 2022-02-27 NOTE — PROGRESS NOTES
Kacie Hwang is a 51 y.o. female presents for thyroid disorder   HPI   No interim change in medical management    Takes medication daily as prescribed    Has another appointment in a few minutes and needs to leave       Past Medical History:   Diagnosis Date    Anemia NEC     Asthma     HPV in female     Hypertension     Thyroid disease        Allergies   Allergen Reactions    Shellfish Derived Hives       Past Surgical History:   Procedure Laterality Date    HX GYN  2/18/14    HYSTEROSCOPY /DILATATION AND CURETTAGE, BARTHOLIN MARSUPILIZATION    HX TUBAL LIGATION         Family History   Problem Relation Age of Onset    Diabetes Father     Stroke Father     High Cholesterol Father     Kidney Disease Father     Alcohol abuse Father     Anemia Father     Other Mother         clot to leg    Cancer Maternal Grandmother     SLE Brother     Asthma Son      Review of Systems   Constitutional: Negative. Eyes: Negative. Cardiovascular: Negative. Gastrointestinal: Negative. Genitourinary: Negative. Neurological: Negative for dizziness and headaches. Objective  Physical Exam  Constitutional:       General: She is not in acute distress. Appearance: Normal appearance. She is not ill-appearing. Cardiovascular:      Rate and Rhythm: Normal rate and regular rhythm. Pulses: Normal pulses. Heart sounds: Normal heart sounds. Pulmonary:      Effort: Pulmonary effort is normal.      Breath sounds: Normal breath sounds. Skin:     General: Skin is warm and dry. Neurological:      Mental Status: She is alert. Mental status is at baseline. Psychiatric:         Mood and Affect: Mood normal.         Thought Content: Thought content normal.          Assessment & Plan    ICD-10-CM ICD-9-CM    1.  Postablative hypothyroidism  E89.0 244.1 TSH 3RD GENERATION      T4, FREE      CBC WITH AUTOMATED DIFF      METABOLIC PANEL, COMPREHENSIVE      METABOLIC PANEL, COMPREHENSIVE CBC WITH AUTOMATED DIFF      T4, FREE      TSH 3RD GENERATION     Diagnoses and all orders for this visit:    1. Postablative hypothyroidism  -     TSH 3RD GENERATION; Future  -     T4, FREE; Future  -     CBC WITH AUTOMATED DIFF; Future  -     METABOLIC PANEL, COMPREHENSIVE; Future      Follow-up and Dispositions    · Return in about 4 months (around 6/23/2022) for thyroid disorder.        Michael Franco, NP

## 2022-03-19 PROBLEM — I10 ESSENTIAL HYPERTENSION: Status: ACTIVE | Noted: 2019-11-15

## 2022-03-20 PROBLEM — I26.99 PULMONARY EMBOLUS (HCC): Status: ACTIVE | Noted: 2019-11-15

## 2022-04-18 ENCOUNTER — APPOINTMENT (OUTPATIENT)
Dept: CT IMAGING | Age: 47
End: 2022-04-18
Attending: EMERGENCY MEDICINE
Payer: COMMERCIAL

## 2022-04-18 ENCOUNTER — HOSPITAL ENCOUNTER (EMERGENCY)
Age: 47
Discharge: HOME | End: 2022-04-18
Attending: EMERGENCY MEDICINE
Payer: COMMERCIAL

## 2022-04-18 VITALS
OXYGEN SATURATION: 100 % | HEIGHT: 67 IN | SYSTOLIC BLOOD PRESSURE: 122 MMHG | HEART RATE: 56 BPM | RESPIRATION RATE: 18 BRPM | DIASTOLIC BLOOD PRESSURE: 82 MMHG | BODY MASS INDEX: 29.1 KG/M2 | TEMPERATURE: 98 F | WEIGHT: 185.41 LBS

## 2022-04-18 DIAGNOSIS — R10.9 ACUTE LEFT FLANK PAIN: Primary | ICD-10-CM

## 2022-04-18 DIAGNOSIS — R91.1 RIGHT LOWER LOBE PULMONARY NODULE: ICD-10-CM

## 2022-04-18 LAB
ALBUMIN SERPL-MCNC: 3.9 G/DL (ref 3.5–5)
ALBUMIN/GLOB SERPL: 1 {RATIO} (ref 1.1–2.2)
ALP SERPL-CCNC: 61 U/L (ref 45–117)
ALT SERPL-CCNC: 20 U/L (ref 12–78)
ANION GAP SERPL CALC-SCNC: 4 MMOL/L (ref 5–15)
APPEARANCE UR: CLEAR
AST SERPL-CCNC: 22 U/L (ref 15–37)
BACTERIA URNS QL MICRO: NEGATIVE /HPF
BASOPHILS # BLD: 0.1 K/UL (ref 0–0.1)
BASOPHILS NFR BLD: 1 % (ref 0–1)
BILIRUB SERPL-MCNC: 1 MG/DL (ref 0.2–1)
BILIRUB UR QL: NEGATIVE
BUN SERPL-MCNC: 15 MG/DL (ref 6–20)
BUN/CREAT SERPL: 14 (ref 12–20)
CALCIUM SERPL-MCNC: 9.1 MG/DL (ref 8.5–10.1)
CHLORIDE SERPL-SCNC: 103 MMOL/L (ref 97–108)
CO2 SERPL-SCNC: 28 MMOL/L (ref 21–32)
COLOR UR: ABNORMAL
CREAT SERPL-MCNC: 1.05 MG/DL (ref 0.55–1.02)
DIFFERENTIAL METHOD BLD: NORMAL
EOSINOPHIL # BLD: 0.3 K/UL (ref 0–0.4)
EOSINOPHIL NFR BLD: 4 % (ref 0–7)
EPITH CASTS URNS QL MICRO: ABNORMAL /LPF
ERYTHROCYTE [DISTWIDTH] IN BLOOD BY AUTOMATED COUNT: 12.5 % (ref 11.5–14.5)
GLOBULIN SER CALC-MCNC: 4.1 G/DL (ref 2–4)
GLUCOSE SERPL-MCNC: 98 MG/DL (ref 65–100)
GLUCOSE UR STRIP.AUTO-MCNC: NEGATIVE MG/DL
HCT VFR BLD AUTO: 41.6 % (ref 35–47)
HGB BLD-MCNC: 13.7 G/DL (ref 11.5–16)
HGB UR QL STRIP: NEGATIVE
HYALINE CASTS URNS QL MICRO: ABNORMAL /LPF (ref 0–5)
IMM GRANULOCYTES # BLD AUTO: 0 K/UL (ref 0–0.04)
IMM GRANULOCYTES NFR BLD AUTO: 0 % (ref 0–0.5)
KETONES UR QL STRIP.AUTO: NEGATIVE MG/DL
LEUKOCYTE ESTERASE UR QL STRIP.AUTO: NEGATIVE
LYMPHOCYTES # BLD: 2.2 K/UL (ref 0.8–3.5)
LYMPHOCYTES NFR BLD: 35 % (ref 12–49)
MCH RBC QN AUTO: 30.2 PG (ref 26–34)
MCHC RBC AUTO-ENTMCNC: 32.9 G/DL (ref 30–36.5)
MCV RBC AUTO: 91.6 FL (ref 80–99)
MONOCYTES # BLD: 0.6 K/UL (ref 0–1)
MONOCYTES NFR BLD: 9 % (ref 5–13)
NEUTS SEG # BLD: 3.2 K/UL (ref 1.8–8)
NEUTS SEG NFR BLD: 51 % (ref 32–75)
NITRITE UR QL STRIP.AUTO: NEGATIVE
NRBC # BLD: 0 K/UL (ref 0–0.01)
NRBC BLD-RTO: 0 PER 100 WBC
PH UR STRIP: 7 [PH] (ref 5–8)
PLATELET # BLD AUTO: 290 K/UL (ref 150–400)
PMV BLD AUTO: 9.9 FL (ref 8.9–12.9)
POTASSIUM SERPL-SCNC: 3.5 MMOL/L (ref 3.5–5.1)
PROT SERPL-MCNC: 8 G/DL (ref 6.4–8.2)
PROT UR STRIP-MCNC: NEGATIVE MG/DL
RBC # BLD AUTO: 4.54 M/UL (ref 3.8–5.2)
RBC #/AREA URNS HPF: ABNORMAL /HPF (ref 0–5)
SODIUM SERPL-SCNC: 135 MMOL/L (ref 136–145)
SP GR UR REFRACTOMETRY: 1.03 (ref 1–1.03)
UA: UC IF INDICATED,UAUC: ABNORMAL
UROBILINOGEN UR QL STRIP.AUTO: 1 EU/DL (ref 0.2–1)
WBC # BLD AUTO: 6.4 K/UL (ref 3.6–11)
WBC URNS QL MICRO: ABNORMAL /HPF (ref 0–4)

## 2022-04-18 PROCEDURE — 99284 EMERGENCY DEPT VISIT MOD MDM: CPT

## 2022-04-18 PROCEDURE — 74011250636 HC RX REV CODE- 250/636: Performed by: EMERGENCY MEDICINE

## 2022-04-18 PROCEDURE — 80053 COMPREHEN METABOLIC PANEL: CPT

## 2022-04-18 PROCEDURE — 74176 CT ABD & PELVIS W/O CONTRAST: CPT

## 2022-04-18 PROCEDURE — 85025 COMPLETE CBC W/AUTO DIFF WBC: CPT

## 2022-04-18 PROCEDURE — 81001 URINALYSIS AUTO W/SCOPE: CPT

## 2022-04-18 PROCEDURE — 36415 COLL VENOUS BLD VENIPUNCTURE: CPT

## 2022-04-18 RX ORDER — NAPROXEN 500 MG/1
500 TABLET ORAL 2 TIMES DAILY WITH MEALS
Qty: 20 TABLET | Refills: 0 | Status: SHIPPED | OUTPATIENT
Start: 2022-04-18 | End: 2022-04-28

## 2022-04-18 RX ORDER — METHOCARBAMOL 500 MG/1
500 TABLET, FILM COATED ORAL 4 TIMES DAILY
Qty: 20 TABLET | Refills: 0 | Status: SHIPPED | OUTPATIENT
Start: 2022-04-18 | End: 2022-06-21

## 2022-04-18 RX ADMIN — SODIUM CHLORIDE 500 ML: 9 INJECTION, SOLUTION INTRAVENOUS at 06:31

## 2022-04-18 NOTE — ED PROVIDER NOTES
EMERGENCY DEPARTMENT HISTORY AND PHYSICAL EXAM      Date: 4/18/2022  Patient Name: Antony Carr    History of Presenting Illness     Chief Complaint   Patient presents with    Flank Pain     L flank pain starting about a week ago and progressively getting worse. Also states pain is worse after drinking water. States her urine was dark/odorous when working. Denies Hx of kidney issues    Nausea     Intermittent       History Provided By: Patient    HPI: Antony Carr, 51 y.o. female with PMHx significant for asthma, hypothyroidism, anemia presents to the ED with left-sided back pain for the past 2 weeks that has gotten progressively worse. 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 Denies any known injury, but last night she ended up leaving work early because her pain was severe. She decided to come home and come to the ED for evaluation. She reports the pain is severe that it causes her to have to sit down and she becomes short of breath due to pain. States her pain is worse when she drinks water. Reports her urine has been dark, but she has not seen any blood in her urine and has not had any pain with urination or urinary frequency. Denies any fevers or chills. She reports nausea but no vomiting. Denies any history of kidney stone or recurrent UTIs. She has not taken any over-the-counter medications for her symptoms. Pain was 8 out of 10 in intensity and sharp yesterday, but is now minimal (1 out of 10) PCP: Sudheer Miguel NP    No current facility-administered medications on file prior to encounter. Current Outpatient Medications on File Prior to Encounter   Medication Sig Dispense Refill    levothyroxine (SYNTHROID) 88 mcg tablet Take 1 Tab by mouth Daily (before breakfast).  80 Tab 0       Past History     Past Medical History:  Past Medical History:   Diagnosis Date    Anemia NEC     Asthma     HPV in female     Hypertension     Thyroid disease        Past Surgical History:  Past Surgical History:   Procedure Laterality Date    HX GYN  2/18/14    HYSTEROSCOPY [place] AND CURETTAGE, BARTHOLIN MARSUPILIZATION    HX TUBAL LIGATION         Family History:  Family History   Problem Relation Age of Onset    Diabetes Father     Stroke Father     High Cholesterol Father     Kidney Disease Father     Alcohol abuse Father     Anemia Father     Other Mother         clot to leg    Cancer Maternal Grandmother     SLE Brother     Asthma Son        Social History:  Social History     Tobacco Use    Smoking status: Never Smoker    Smokeless tobacco: Never Used   Substance Use Topics    Alcohol use: No    Drug use: No       Allergies: Allergies   Allergen Reactions    Shellfish Derived Hives         Review of Systems   Review of Systems   Constitutional: Negative for chills and fever. HENT: Negative for congestion, ear pain, rhinorrhea and sore throat. Respiratory: Negative for cough, chest tightness and shortness of breath. Cardiovascular: Negative for chest pain and palpitations. Gastrointestinal: Positive for abdominal pain and nausea. Negative for constipation, diarrhea and vomiting. Genitourinary: Negative for dysuria, flank pain and hematuria. Musculoskeletal: Positive for back pain. Negative for gait problem, myalgias and neck pain. Skin: Negative for rash and wound. Neurological: Negative for dizziness, syncope, light-headedness and headaches. Psychiatric/Behavioral: Negative for confusion. The patient is not nervous/anxious. All other systems reviewed and are negative.         Physical Exam    General appearance - well nourished, well appearing, and in no distress  Eyes - pupils equal and reactive, extraocular eye movements intact  ENT - mucous membranes moist, pharynx normal without lesions  Neck - supple, no significant adenopathy; non-tender to palpation  Chest - clear to auscultation, no wheezes, rales or rhonchi; non-tender to palpation  Heart -bradycardic, regular rhythm, S1 and S2 normal, no murmurs noted  Abdomen - soft, nontender, nondistended, no masses or organomegaly  Back-no tenderness to palpation midline or bilateral paralumbar or parathoracic musculature, no costovertebral tenderness. Musculoskeletal - no joint tenderness, deformity or swelling; normal ROM  Extremities - peripheral pulses normal, no pedal edema  Skin - normal coloration and turgor, no rashes  Neurological - alert, oriented x3, normal speech, no focal findings or movement disorder noted    Diagnostic Study Results     Labs -     Recent Results (from the past 24 hour(s))   URINALYSIS W/ REFLEX CULTURE    Collection Time: 04/18/22  5:55 AM    Specimen: Urine   Result Value Ref Range    Color YELLOW/STRAW      Appearance CLEAR CLEAR      Specific gravity 1.028 1.003 - 1.030      pH (UA) 7.0 5.0 - 8.0      Protein Negative NEG mg/dL    Glucose Negative NEG mg/dL    Ketone Negative NEG mg/dL    Bilirubin Negative NEG      Blood Negative NEG      Urobilinogen 1.0 0.2 - 1.0 EU/dL    Nitrites Negative NEG      Leukocyte Esterase Negative NEG      WBC 0-4 0 - 4 /hpf    RBC 0-5 0 - 5 /hpf    Epithelial cells MODERATE (A) FEW /lpf    Bacteria Negative NEG /hpf    UA:UC IF INDICATED CULTURE NOT INDICATED BY UA RESULT CNI      Hyaline cast 0-2 0 - 5 /lpf   CBC WITH AUTOMATED DIFF    Collection Time: 04/18/22  5:55 AM   Result Value Ref Range    WBC 6.4 3.6 - 11.0 K/uL    RBC 4.54 3.80 - 5.20 M/uL    HGB 13.7 11.5 - 16.0 g/dL    HCT 41.6 35.0 - 47.0 %    MCV 91.6 80.0 - 99.0 FL    MCH 30.2 26.0 - 34.0 PG    MCHC 32.9 30.0 - 36.5 g/dL    RDW 12.5 11.5 - 14.5 %    PLATELET 497 402 - 054 K/uL    MPV 9.9 8.9 - 12.9 FL    NRBC 0.0 0  WBC    ABSOLUTE NRBC 0.00 0.00 - 0.01 K/uL    NEUTROPHILS 51 32 - 75 %    LYMPHOCYTES 35 12 - 49 %    MONOCYTES 9 5 - 13 %    EOSINOPHILS 4 0 - 7 %    BASOPHILS 1 0 - 1 %    IMMATURE GRANULOCYTES 0 0.0 - 0.5 %    ABS.  NEUTROPHILS 3.2 1.8 - 8.0 K/UL    ABS. LYMPHOCYTES 2.2 0.8 - 3.5 K/UL    ABS. MONOCYTES 0.6 0.0 - 1.0 K/UL    ABS. EOSINOPHILS 0.3 0.0 - 0.4 K/UL    ABS. BASOPHILS 0.1 0.0 - 0.1 K/UL    ABS. IMM. GRANS. 0.0 0.00 - 0.04 K/UL    DF AUTOMATED     METABOLIC PANEL, COMPREHENSIVE    Collection Time: 04/18/22  5:55 AM   Result Value Ref Range    Sodium 135 (L) 136 - 145 mmol/L    Potassium 3.5 3.5 - 5.1 mmol/L    Chloride 103 97 - 108 mmol/L    CO2 28 21 - 32 mmol/L    Anion gap 4 (L) 5 - 15 mmol/L    Glucose 98 65 - 100 mg/dL    BUN 15 6 - 20 MG/DL    Creatinine 1.05 (H) 0.55 - 1.02 MG/DL    BUN/Creatinine ratio 14 12 - 20      GFR est AA >60 >60 ml/min/1.73m2    GFR est non-AA 56 (L) >60 ml/min/1.73m2    Calcium 9.1 8.5 - 10.1 MG/DL    Bilirubin, total 1.0 0.2 - 1.0 MG/DL    ALT (SGPT) 20 12 - 78 U/L    AST (SGOT) 22 15 - 37 U/L    Alk. phosphatase 61 45 - 117 U/L    Protein, total 8.0 6.4 - 8.2 g/dL    Albumin 3.9 3.5 - 5.0 g/dL    Globulin 4.1 (H) 2.0 - 4.0 g/dL    A-G Ratio 1.0 (L) 1.1 - 2.2         Radiologic Studies -   CT ABD PELV WO CONT   Final Result   No acute process. Incidental 5 mm right lower lobe pulmonary nodule. Guidelines by the Fleischner   society (Radiology 2005: 703:178-871) suggest that patients with low risk for   lung cancer and nodules greater than 4 mm and less than or equal to 6 mm in   diameter should have follow up in 12 months. In patients with a higher risk,   such as smokers, follow-up is recommended in 6 months. Patients with a known   malignancy are at increased risk for metastasis and should receive a three month   follow-up. CT Results  (Last 48 hours)               04/18/22 0618  CT ABD PELV WO CONT Final result    Impression:  No acute process. Incidental 5 mm right lower lobe pulmonary nodule.  Guidelines by the Fleischner   society (Radiology 2005: 769:346-898) suggest that patients with low risk for   lung cancer and nodules greater than 4 mm and less than or equal to 6 mm in diameter should have follow up in 12 months. In patients with a higher risk,   such as smokers, follow-up is recommended in 6 months. Patients with a known   malignancy are at increased risk for metastasis and should receive a three month   follow-up. Narrative:  INDICATION: left flank pain       COMPARISON: None       TECHNIQUE:    Noncontrast thin axial images were obtained through the abdomen and pelvis. Coronal and sagittal reconstructions were generated. CT dose reduction was   achieved through use of a standardized protocol tailored for this examination   and automatic exposure control for dose modulation. FINDINGS:    LUNG BASES: 5 mm right lower lobe nodule. LIVER: No mass or biliary dilatation. GALLBLADDER: Unremarkable. SPLEEN: No enlargement or lesion. PANCREAS: No mass or ductal dilatation. ADRENALS: No mass. KIDNEYS: No nephrolithiasis or hydronephrosis. GI TRACT:  No bowel obstruction. Difficult to assess bowel wall thickening given   lack of oral contrast material.   PERITONEUM: No free air or free fluid. APPENDIX: Unremarkable. RETROPERITONEUM: No aortic aneurysm. LYMPH NODES:  None enlarged. ADDITIONAL COMMENTS: N/A.       URINARY BLADDER: Unremarkable. REPRODUCTIVE ORGANS: Unremarkable. LYMPH NODES:  None enlarged. FREE FLUID:  None. BONES: No destructive bone lesion. ADDITIONAL COMMENTS: 2 metallic clips in the left mid abdomen, may represent   tubal ligation clips. CXR Results  (Last 48 hours)    None            Medical Decision Making   I am the first provider for this patient. I reviewed the vital signs, available nursing notes, past medical history, past surgical history, family history and social history. Vital Signs-Reviewed the patient's vital signs.   Patient Vitals for the past 12 hrs:   Temp Pulse Resp BP SpO2   04/18/22 0700 -- -- -- 122/82 --   04/18/22 0546 98 °F (36.7 °C) (!) 56 18 (!) 150/91 100 % Records Reviewed: Nursing Notes and Old Medical Records    Provider Notes (Medical Decision Making):   Differential diagnosis: Pyelonephritis, muscle strain, kidney stone, diverticulitis  We will check CBC, CMP, UA, stone study. Patient declining treatment with analgesics and states she does not require nausea medication at this time. ED Course:   Initial assessment performed. The patients presenting problems have been discussed, and they are in agreement with the care plan formulated and outlined with them. I have encouraged them to ask questions as they arise throughout their visit. Progress Notes:  ED Course as of 04/18/22 0714   Mon Apr 18, 2022   0710 Labs and imaging reviewed. Lab work is unremarkable. CT shows no explanation for left flank pain. Patient does have a right lower lobe pulmonary nodule that is 5 mm. Patient reports she has been evaluated for this pulmonary nodule in the past.  She was given copy of her CT report and instructed to follow-up with her PCP to decide if follow-up imaging is warranted.  [AO]      ED Course User Index  [AO] Lorraine Cortez MD       Disposition:  Discharge home    PLAN:  1. Current Discharge Medication List      START taking these medications    Details   methocarbamoL (Robaxin) 500 mg tablet Take 1 Tablet by mouth four (4) times daily. Qty: 20 Tablet, Refills: 0  Start date: 4/18/2022      naproxen (Naprosyn) 500 mg tablet Take 1 Tablet by mouth two (2) times daily (with meals) for 10 days. Qty: 20 Tablet, Refills: 0  Start date: 4/18/2022, End date: 4/28/2022           2. Follow-up Information     Follow up With Specialties Details Why Contact Info    Shahbaz De León NP Family Medicine Schedule an appointment as soon as possible for a visit  As needed Steven Aram Parrish  [phone]          Return to ED if worse     Diagnosis     Clinical Impression:   1. Acute left flank pain    2.  Right lower lobe pulmonary nodule

## 2022-04-18 NOTE — ED NOTES
This RN agrees with triage note. Chief Complaint   Patient presents with    Flank Pain     L flank pain starting about a week ago and progressively getting worse. Also states pain is worse after drinking water. States her urine was dark/odorous when working. Denies Hx of kidney issues    Nausea     Intermittent       Pt A&Ox4 (person, place, time, and situation). Respirations even and unlabored. Skin warm, dry, and appropriate for ethnicity. PMS intact. Pt on continuous monitor. Vitals as charted. NAD noted. Stretcher in low and locked position. Denies having any further needs at this time. Call light within reach.

## 2022-05-30 ENCOUNTER — APPOINTMENT (OUTPATIENT)
Dept: GENERAL RADIOLOGY | Age: 47
End: 2022-05-30
Attending: PHYSICIAN ASSISTANT
Payer: COMMERCIAL

## 2022-05-30 ENCOUNTER — HOSPITAL ENCOUNTER (EMERGENCY)
Age: 47
Discharge: HOME | End: 2022-05-30
Attending: EMERGENCY MEDICINE
Payer: COMMERCIAL

## 2022-05-30 VITALS
OXYGEN SATURATION: 99 % | DIASTOLIC BLOOD PRESSURE: 86 MMHG | RESPIRATION RATE: 16 BRPM | SYSTOLIC BLOOD PRESSURE: 137 MMHG | HEART RATE: 60 BPM | TEMPERATURE: 97.8 F

## 2022-05-30 DIAGNOSIS — R07.89 ATYPICAL CHEST PAIN: ICD-10-CM

## 2022-05-30 DIAGNOSIS — Z86.711 HISTORY OF PULMONARY EMBOLISM: ICD-10-CM

## 2022-05-30 DIAGNOSIS — R06.02 SOB (SHORTNESS OF BREATH): Primary | ICD-10-CM

## 2022-05-30 LAB
ALBUMIN SERPL-MCNC: 3.9 G/DL (ref 3.5–5)
ALBUMIN/GLOB SERPL: 0.9 {RATIO} (ref 1.1–2.2)
ALP SERPL-CCNC: 77 U/L (ref 45–117)
ALT SERPL-CCNC: 30 U/L (ref 12–78)
ANION GAP SERPL CALC-SCNC: 4 MMOL/L (ref 5–15)
AST SERPL-CCNC: 42 U/L (ref 15–37)
BASOPHILS # BLD: 0 K/UL (ref 0–0.1)
BASOPHILS NFR BLD: 1 % (ref 0–1)
BILIRUB SERPL-MCNC: 0.4 MG/DL (ref 0.2–1)
BUN SERPL-MCNC: 9 MG/DL (ref 6–20)
BUN/CREAT SERPL: 8 (ref 12–20)
CALCIUM SERPL-MCNC: 9.3 MG/DL (ref 8.5–10.1)
CHLORIDE SERPL-SCNC: 104 MMOL/L (ref 97–108)
CO2 SERPL-SCNC: 30 MMOL/L (ref 21–32)
COMMENT, HOLDF: NORMAL
CREAT SERPL-MCNC: 1.08 MG/DL (ref 0.55–1.02)
D DIMER PPP FEU-MCNC: 0.39 MG/L FEU (ref 0–0.65)
DIFFERENTIAL METHOD BLD: ABNORMAL
EOSINOPHIL # BLD: 0.5 K/UL (ref 0–0.4)
EOSINOPHIL NFR BLD: 12 % (ref 0–7)
ERYTHROCYTE [DISTWIDTH] IN BLOOD BY AUTOMATED COUNT: 13.1 % (ref 11.5–14.5)
FLUAV AG NPH QL IA: NEGATIVE
FLUBV AG NOSE QL IA: NEGATIVE
GLOBULIN SER CALC-MCNC: 4.3 G/DL (ref 2–4)
GLUCOSE SERPL-MCNC: 130 MG/DL (ref 65–100)
HCT VFR BLD AUTO: 41.6 % (ref 35–47)
HGB BLD-MCNC: 14 G/DL (ref 11.5–16)
IMM GRANULOCYTES # BLD AUTO: 0 K/UL (ref 0–0.04)
IMM GRANULOCYTES NFR BLD AUTO: 0 % (ref 0–0.5)
LYMPHOCYTES # BLD: 1.8 K/UL (ref 0.8–3.5)
LYMPHOCYTES NFR BLD: 42 % (ref 12–49)
MCH RBC QN AUTO: 30.7 PG (ref 26–34)
MCHC RBC AUTO-ENTMCNC: 33.7 G/DL (ref 30–36.5)
MCV RBC AUTO: 91.2 FL (ref 80–99)
MONOCYTES # BLD: 0.6 K/UL (ref 0–1)
MONOCYTES NFR BLD: 14 % (ref 5–13)
NEUTS SEG # BLD: 1.3 K/UL (ref 1.8–8)
NEUTS SEG NFR BLD: 31 % (ref 32–75)
NRBC # BLD: 0 K/UL (ref 0–0.01)
NRBC BLD-RTO: 0 PER 100 WBC
PLATELET # BLD AUTO: 230 K/UL (ref 150–400)
PMV BLD AUTO: 9.7 FL (ref 8.9–12.9)
POTASSIUM SERPL-SCNC: 3.5 MMOL/L (ref 3.5–5.1)
PROT SERPL-MCNC: 8.2 G/DL (ref 6.4–8.2)
RBC # BLD AUTO: 4.56 M/UL (ref 3.8–5.2)
SAMPLES BEING HELD,HOLD: NORMAL
SARS-COV-2, COV2: NORMAL
SODIUM SERPL-SCNC: 138 MMOL/L (ref 136–145)
TROPONIN-HIGH SENSITIVITY: 18 NG/L (ref 0–51)
TROPONIN-HIGH SENSITIVITY: 19 NG/L (ref 0–51)
WBC # BLD AUTO: 4.3 K/UL (ref 3.6–11)

## 2022-05-30 PROCEDURE — 71046 X-RAY EXAM CHEST 2 VIEWS: CPT

## 2022-05-30 PROCEDURE — 84484 ASSAY OF TROPONIN QUANT: CPT

## 2022-05-30 PROCEDURE — U0005 INFEC AGEN DETEC AMPLI PROBE: HCPCS

## 2022-05-30 PROCEDURE — 93005 ELECTROCARDIOGRAM TRACING: CPT

## 2022-05-30 PROCEDURE — 87804 INFLUENZA ASSAY W/OPTIC: CPT

## 2022-05-30 PROCEDURE — 85379 FIBRIN DEGRADATION QUANT: CPT

## 2022-05-30 PROCEDURE — 85025 COMPLETE CBC W/AUTO DIFF WBC: CPT

## 2022-05-30 PROCEDURE — 36415 COLL VENOUS BLD VENIPUNCTURE: CPT

## 2022-05-30 PROCEDURE — 99285 EMERGENCY DEPT VISIT HI MDM: CPT

## 2022-05-30 PROCEDURE — 80053 COMPREHEN METABOLIC PANEL: CPT

## 2022-05-30 RX ORDER — ALBUTEROL SULFATE 90 UG/1
2 AEROSOL, METERED RESPIRATORY (INHALATION)
Qty: 18 G | Refills: 0 | Status: SHIPPED | OUTPATIENT
Start: 2022-05-30 | End: 2022-06-21

## 2022-05-30 RX ORDER — PREDNISONE 50 MG/1
50 TABLET ORAL DAILY
Qty: 5 TABLET | Refills: 0 | Status: SHIPPED | OUTPATIENT
Start: 2022-05-30 | End: 2022-06-04

## 2022-05-30 NOTE — ED PROVIDER NOTES
58-year-old female presenting to the ED for shortness of breath and chest heaviness. Patient reports that symptoms started on Thursday, 4 days ago, with cough, sneezing, rhinorrhea. Has been trying Mucinex without relief. Patient reports some chest heaviness and some pain on both sides of her back, intermittent, not pleuritic. Notes that it is not uncommon for her to have symptoms like this with a change of the season. No fever. No wheezing but does feel some chest tightness. Patient is not vaccinated for flu or COVID. Mom is currently being seen tonight for URI symptoms. Reports that this does not feel anything like when she had her PE a few years ago which was post hysterectomy, notes that she saw hematology and they felt that it was purely related to her surgery. Did drive to [facility] 2 weeks ago. No unilateral leg pain/swelling. No hemoptysis. No exogenous estrogen. 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 Nurse.              Past Medical History:   Diagnosis Date    Anemia NEC     Asthma     HPV in female     Hypertension     Thyroid disease        Past Surgical History:   Procedure Laterality Date    HX GYN  2/18/14    HYSTEROSCOPY [facility] [place] AND CURETTAGE, BARTHOLIN MARSUPILIZATION    HX TUBAL LIGATION           Family History:   Problem Relation Age of Onset    Diabetes Father     Stroke Father     High Cholesterol Father     Kidney Disease Father     Alcohol abuse Father     Anemia Father     Other Mother         clot to leg    Cancer Maternal Grandmother     SLE Brother     Asthma Son        Social History     Socioeconomic History    Marital status:      Spouse name: Not on file    Number of children: Not on file    Years of education: Not on file    Highest education level: Not on file   Occupational History    Not on file   Tobacco Use    Smoking status: Never Smoker    Smokeless tobacco: Never Used   Substance and Sexual Activity    Alcohol use: No    Drug use: No    Sexual activity: Yes     Partners: Male     Birth control/protection: Pill   Other Topics Concern    Not on file   Social History Narrative    Not on file     Social Determinants of Health     Financial Resource Strain:     Difficulty of Paying Living Expenses: Not on file   Food Insecurity:     Worried About Running Out of Food in the Last Year: Not on file    Nadia of Food in the Last Year: Not on file   Transportation Needs:     Lack of Transportation (Medical): Not on file    Lack of Transportation (Non-Medical): Not on file   Physical Activity:     Days of Exercise per Week: Not on file    Minutes of Exercise per Session: Not on file   Stress:     Feeling of Stress : Not on file   Social Connections:     Frequency of Communication with Friends and Family: Not on file    Frequency of Social Gatherings with Friends and Family: Not on file    Attends Taoist Services: Not on file    Active Member of [address] [place] [address] or Organizations: Not on file    Attends Club or Organization Meetings: Not on file    Marital Status: Not on file   Intimate Partner Violence:     Fear of Current or Ex-Partner: Not on file    Emotionally Abused: Not on file    Physically Abused: Not on file    Sexually Abused: Not on file   Housing Stability:     Unable to Pay for Housing in the Last Year: Not on file    Number of [place] in the Last Year: Not on file    Unstable Housing in the Last Year: Not on file         ALLERGIES: Shellfish derived    Review of Systems   Constitutional: Negative for fever. HENT: Positive for rhinorrhea. Negative for facial swelling. Respiratory: Positive for cough and shortness of breath. Cardiovascular: Positive for chest pain. Gastrointestinal: Negative for vomiting. Musculoskeletal: Positive for back pain. Skin: Negative for wound.    Neurological: Negative for syncope. All other systems reviewed and are negative. Vitals:    05/30/22 1755   BP: (!) 155/97   Pulse: 70   Resp: 16   Temp: 97.9 °F (36.6 °C)   SpO2: 99%            Physical Exam  Vitals and nursing note reviewed. Constitutional:       General: She is not in acute distress. Appearance: She is well-developed. Comments: Pleasant, well-appearing, no distress   HENT:      Head: Normocephalic and atraumatic. Right Ear: External ear normal.      Left Ear: External ear normal.   Eyes:      General: No scleral icterus. Conjunctiva/sclera: Conjunctivae normal.   Neck:      Trachea: No tracheal deviation. Cardiovascular:      Rate and Rhythm: Normal rate and regular rhythm. Heart sounds: Normal heart sounds. No murmur heard. No friction rub. No gallop. Pulmonary:      Effort: Pulmonary effort is normal. No respiratory distress. Breath sounds: Normal breath sounds. No stridor. No wheezing. Abdominal:      General: There is no distension. Palpations: Abdomen is soft. Musculoskeletal:         General: Normal range of motion. Cervical back: Neck supple. Skin:     General: Skin is warm and dry. Neurological:      Mental Status: She is alert and oriented to person, place, and time. Psychiatric:         Behavior: Behavior normal.          MDM  Number of Diagnoses or Management Options  Atypical chest pain  History of pulmonary embolism  SOB (shortness of breath)  Diagnosis management comments: 62-year-old female presenting to the ED for 4-day history of URI symptoms with shortness of breath, back pain, chest heaviness and tightness. Patient does have a history of a prior provoked DVT and did have an 8 or 9-hour drive 2 weeks ago. No pleuritic pain, hemoptysis, leg pain/swelling. Reassuring vital signs. Nonischemic EKG, negative serial enzymes and D-dimer.   Discussed with patient that given URI symptoms and recent history of asthma, symptoms could very well be viral in nature, discussed possible bronchospasm. Will treat symptomatically with burst of prednisone and albuterol inhaler. Return precautions given.        Amount and/or Complexity of Data Reviewed  Clinical lab tests: ordered and reviewed  Tests in the radiology section of CPT®: ordered and reviewed  Tests in the medicine section of CPT®: reviewed  Discuss the patient with other providers: yes (Dr. Yaneth Gonzalez ED attending)           Procedures

## 2022-05-30 NOTE — ED TRIAGE NOTES
She reports 2-3 days of upper abdomen, back and chest pressure. She says the cold air blowing on it makes it worse. She says occasionally she has some mild shortness of breath.

## 2022-05-31 ENCOUNTER — PATIENT OUTREACH (OUTPATIENT)
Dept: CASE MANAGEMENT | Age: 47
End: 2022-05-31

## 2022-05-31 LAB
ATRIAL RATE: 69 BPM
CALCULATED P AXIS, ECG09: 83 DEGREES
CALCULATED R AXIS, ECG10: 48 DEGREES
CALCULATED T AXIS, ECG11: 70 DEGREES
DIAGNOSIS, 93000: NORMAL
P-R INTERVAL, ECG05: 162 MS
Q-T INTERVAL, ECG07: 390 MS
QRS DURATION, ECG06: 86 MS
QTC CALCULATION (BEZET), ECG08: 417 MS
SARS-COV-2, XPLCVT: DETECTED
SOURCE, COVRS: ABNORMAL
VENTRICULAR RATE, ECG03: 69 BPM

## 2022-05-31 NOTE — DISCHARGE INSTRUCTIONS
Return for new or worsening symptoms. Your D-dimer tonight that would look for a blood clot was negative. Your EKG, troponin, chest x-ray were all reassuring. It is likely that given your cough and other upper respiratory symptoms that your symptoms could very well be viral in nature. Your flu test was negative but your COVID test is still pending. We will send prescriptions for steroids for inflammation and an inhaler to your pharmacy to  and use.

## 2022-05-31 NOTE — ED NOTES
Pt left the ED ambulatory. Pt was encouraged to call/return to the ED for any worsening conditions or any other concerns. Pt verbalized understanding.

## 2022-06-01 NOTE — PROGRESS NOTES
Outgoing calls attempted no answer message left with this writer's contact information. No response from patient, episode of care close unable to contact.

## 2022-06-01 NOTE — PROGRESS NOTES
11:00 AM    I called and left a voicemail for the patient or patient's parent concerning lab results and instructed them to call back the provided number for results. I also advised that the patient's results were available to view via [employer] and to call the provided number if there are any questions.      Purnell Apgar, PA-C

## 2022-06-14 NOTE — ED NOTES
Pt called concerning registered letter. We discussed reason for letter being + covid results.  Pt feeling better  No further

## 2022-06-20 ENCOUNTER — OFFICE VISIT (OUTPATIENT)
Dept: INTERNAL MEDICINE CLINIC | Age: 47
End: 2022-06-20
Payer: COMMERCIAL

## 2022-06-20 VITALS
RESPIRATION RATE: 16 BRPM | HEART RATE: 63 BPM | OXYGEN SATURATION: 100 % | TEMPERATURE: 98.4 F | WEIGHT: 186.4 LBS | BODY MASS INDEX: 29.26 KG/M2 | HEIGHT: 67 IN | SYSTOLIC BLOOD PRESSURE: 119 MMHG | DIASTOLIC BLOOD PRESSURE: 76 MMHG

## 2022-06-20 DIAGNOSIS — E89.0 POSTABLATIVE HYPOTHYROIDISM: Primary | ICD-10-CM

## 2022-06-20 DIAGNOSIS — Z86.711 HX PULMONARY EMBOLISM: ICD-10-CM

## 2022-06-20 DIAGNOSIS — Z12.31 ENCOUNTER FOR SCREENING MAMMOGRAM FOR MALIGNANT NEOPLASM OF BREAST: ICD-10-CM

## 2022-06-20 DIAGNOSIS — E78.00 PURE HYPERCHOLESTEROLEMIA: ICD-10-CM

## 2022-06-20 DIAGNOSIS — R73.01 IFG (IMPAIRED FASTING GLUCOSE): ICD-10-CM

## 2022-06-20 DIAGNOSIS — K59.04 CHRONIC IDIOPATHIC CONSTIPATION: ICD-10-CM

## 2022-06-20 DIAGNOSIS — Z12.11 COLON CANCER SCREENING: ICD-10-CM

## 2022-06-20 PROCEDURE — 99213 OFFICE O/P EST LOW 20 MIN: CPT | Performed by: NURSE PRACTITIONER

## 2022-06-20 RX ORDER — LEVOTHYROXINE SODIUM 88 UG/1
88 TABLET ORAL
Qty: 90 TABLET | Refills: 3 | Status: SHIPPED | OUTPATIENT
Start: 2022-06-20

## 2022-06-20 NOTE — PATIENT INSTRUCTIONS
Mammogram: About This Test  What is it? A mammogram is an X-ray of the breast that is used to screen for breast cancer. This test can find tumors that are too small for you or your doctor to feel. Cancer is most easily treated when it is found at an early stage. Why is this test done? A mammogram is done to:  · Look for breast cancer when there are no symptoms. · Find breast cancer when there are symptoms. Symptoms of breast cancer may include a lump or thickening in the breast, nipple discharge, or dimpling of the skin on one area of the breast.  · Find an area of suspicious breast tissue to remove for an exam under a microscope (biopsy). How do you prepare for the test?  If you've had a mammogram before at another clinic, have the results sent or bring them with you to your appointment. On the day of the mammogram, don't use any deodorant. And don't use perfume, powders, or ointments near or on your breasts. The residue left on your skin by these substances may interfere with the X-rays. How is the test done? · You will need to take off any jewelry that might interfere with the X-ray pictures. · You will need to take off your clothes above the waist.  · You will be given a cloth or paper gown to use during the test.  · You probably will stand during the mammogram.  · One at a time, your breasts will be placed on a flat plate. · Another plate is then pressed firmly against your breast to help flatten out the breast tissue. You may be asked to lift your arm. · For a few seconds while the X-ray picture is being taken, you will need to hold your breath. · At least two pictures are taken of each breast. One is taken from the top and one from the side. How does having a mammogram feel? A mammogram is often uncomfortable but rarely painful.  If you have sensitive or fragile skin or a skin condition, let the technician know before you have your exam. If you have menstrual periods, the procedure is more comfortable when done within 2 weeks after your period has ended. Having your breasts flattened is usually uncomfortable, but it helps the technician get the best images. How long does the test take? · The test will take about 10 to 15 minutes. You may be in the clinic for up to an hour. · You may be asked to wait a few minutes while the images are checked to make sure they don't need to be redone. What happens after the test?  · You will probably be able to go home right away. · You can go back to your usual activities right away. Follow-up care is a key part of your treatment and safety. Be sure to make and go to all appointments, and call your doctor if you are having problems. It's also a good idea to keep a list of the medicines you take. Ask your doctor when you can expect to have your test results. Where can you learn more? Go to http://www.ron.com/  Enter Z238 in the search box to learn more about \"Mammogram: About This Test.\"  Current as of: September 8, 2021               Content Version: 13.2  © 2174-9911 [employer]. Care instructions adapted under license by [employer] (roll.to) (which disclaims liability or warranty for this information). If you have questions about a medical condition or this instruction, always ask your healthcare professional. [address] any warranty or liability for your use of this information. Colon Cancer Screening: Care Instructions  Overview     Colorectal cancer occurs in the colon or rectum. That's the lower part of your digestive system. It often starts in small growths called polyps in the colon or rectum. Polyps are usually found with screening tests. Depending on the type of test, any polyps found may be removed during the tests. Colorectal cancer usually does not cause symptoms at first. But regular tests can help find it early, before it spreads and becomes harder to treat.   Your risk for colorectal cancer gets higher as you get older. Experts recommend starting screening at age 39 for people who are at average risk. Talk with your doctor about your risk and when to start and stop screening. You may have one of several tests. Follow-up care is a key part of your treatment and safety. Be sure to make and go to all appointments, and call your doctor if you are having problems. It's also a good idea to know your test results and keep a list of the medicines you take. What are the main screening tests for colon cancer? The screening tests are:  Stool tests. These include the guaiac fecal occult blood test (gFOBT), the fecal immunochemical test (FIT), and the combined fecal immunochemical test and stool DNA test (FIT-DNA). These tests check stool samples for signs of cancer. If your test is positive, you will need to have a colonoscopy. Sigmoidoscopy. This test lets your doctor look at the lining of your rectum and the lowest part of your colon. Your doctor uses a lighted tube called a sigmoidoscope. This test can't find cancers or polyps in the upper part of your colon. In some cases, polyps that are found can be removed. But if your doctor finds polyps, you will need to have a colonoscopy to check the upper part of your colon. Colonoscopy. This test lets your doctor look at the lining of your rectum and your entire colon. The doctor uses a thin, flexible tool called a colonoscope. It can also be used to remove polyps or get a tissue sample (biopsy). A less common test is CT colonography (CTC). It's also called virtual colonoscopy. Who should be screened for colorectal cancer? Your risk for colorectal cancer gets higher as you get older. Experts recommend starting screening at age 39 for people who are at average risk. Talk with your doctor about your risk and when to start and stop screening. How often you need screening depends on the type of test you get:  Stool tests.   Every year for FIT or gFOBT. Every 1 to 3 years for sDNA, also called FIT-DNA. Tests that look inside the colon. Every 5 years for sigmoidoscopy. (If you do the FIT test every year, you can get this test every 10 years.)  Every 5 years for CT colonography (virtual colonoscopy). Every 10 years for colonoscopy. Experts agree that people at higher risk may need to be tested sooner and more often. This includes people who have a strong family history of colon cancer. Talk to your doctor about which test is best for you and when to be tested. When should you call for help? Watch closely for changes in your health, and be sure to contact your doctor if:    · You have any changes in your bowel habits.     · You have any problems. Where can you learn more? Go to http://www.gray.com/  Enter M541 in the search box to learn more about \"Colon Cancer Screening: Care Instructions. \"  Current as of: September 8, 2021               Content Version: 13.2  © 2006-2022 [employer]. Care instructions adapted under license by [employer] (which disclaims liability or warranty for this information). If you have questions about a medical condition or this instruction, always ask your healthcare professional. [place] [address] any warranty or liability for your use of this information. Learning About Breast Cancer Screening  What is breast cancer screening? Breast cancer occurs when cells that are not normal grow in one or both of your breasts. Screening tests can help find breast cancer early. Cancer is easier to treat when it's found early. Having concerns about breast cancer is common. That's why it's important to talk with your doctor about when to start and how often to get screened for breast cancer. How is breast cancer screening done? Several screening tests can be used to check for breast cancer. Mammograms.   These tests check for signs of cancer using X-rays. They can show tumors that are too small for you or your doctor to feel. During a mammogram, a machine squeezes your breasts to make them flatter and easier to X-ray. At least two pictures are taken of each breast. One is taken from the top and one from the side. 3-D mammograms. These tests are also called digital breast tomosynthesis. Your breast is positioned on a flat plate. A top plate is pressed against your breast to keep it in position. The X-ray arm then moves in an arc above the breast and takes many pictures. A computer uses these X-rays to create a three-dimensional image. Clinical breast exam.  In this exam, your doctor carefully feels your breasts and under your arms to check for lumps or other changes. Who should be screened for breast cancer? Experts agree that mammograms are the best screening test for people at average risk of breast cancer. But they don't all agree on the age at which screening should start. And they don't agree on whether it's better to be screened every year or every two years. Here are some of the recommendations from experts:  · Start by age 36 and have a mammogram each year. · Start at age 39 and have a mammogram each year. · Start at age 48 and have a mammogram every 2 years. When to stop having mammograms is another decision. You and your doctor can decide on the right age to start and stop screening based on your personal preferences and overall health. What is your risk for breast cancer? If you don't already know your risk of breast cancer, you can ask your doctor about it. You can also look it up at www.cancer.gov/bcrisktool/. If your doctor says that you have a high or very high risk, ask about ways to reduce your risk. These could include getting extra screening, taking medicine, or having surgery. If you have a strong family history of breast cancer, ask your doctor about genetic testing. What steps can you take to stay healthy?   Some things that increase your risk of breast cancer, such as your age and being female, cannot be controlled. But you can do some things to stay as healthy as you can. · Learn what your breasts normally look and feel like. If you notice any changes, tell your doctor. · If you drink alcohol, limit how much you drink. Any amount of alcohol may increase your risk for some types of cancer. · If you smoke, quit. When you quit smoking, you lower your chances of getting many types of cancer. You can also do your best to eat well, be active, and stay at a healthy weight. Eating healthy foods and being active every day, as well as staying at a healthy weight, may help prevent cancer. Where can you learn more? Go to http://www.gray.com/  Enter H706 in the search box to learn more about \"Learning About Breast Cancer Screening. \"  Current as of: September 8, 2021               Content Version: 13.2  © 2006-2022 [employer]. Care instructions adapted under license by [employer] (which disclaims liability or warranty for this information). If you have questions about a medical condition or this instruction, always ask your healthcare professional. [place] [address] any warranty or liability for your use of this information. Hypothyroidism: Care Instructions  Your Care Instructions     When you have hypothyroidism, your body doesn't make enough thyroid hormone. This hormone helps your body use energy. If your thyroid level is low, you may feel tired, be constipated, have an increase in your blood pressure, or have dry skin or memory problems. You may also get cold easily, even when it is warm. Women with low thyroid levels may have heavy menstrual periods. A blood test to find your thyroid-stimulating hormone (TSH) level is used to check for hypothyroidism. A high TSH level may mean that you have it. The treatment for hypothyroidism is thyroid hormone pills. You should start to feel better in 1 to 2 weeks. Most people need treatment for the rest of their lives. You will need regular visits with your doctor to make sure you are doing well and that you have the right dose of medicine. Follow-up care is a key part of your treatment and safety. Be sure to make and go to all appointments, and call your doctor if you are having problems. It's also a good idea to know your test results and keep a list of the medicines you take. How can you care for yourself at home? · Take your thyroid hormone medicine exactly as prescribed. Call your doctor if you think you are having a problem with your medicine. Most people do not have side effects if they take the right amount of medicine regularly. ? Take the medicine 30 minutes before breakfast, and do not take it with calcium, vitamins, or iron. ? Do not take extra doses of your thyroid medicine. It will not help you get better any faster, and it may cause side effects. ? If you forget to take a dose, do NOT take a double dose of medicine. Take your usual dose the next day. · Tell your doctor about all prescription, herbal, or over-the-counter products you take. · Take care of yourself. Eat a healthy diet, get enough sleep, and get regular exercise. When should you call for help? Call 911 anytime you think you may need emergency care. For example, call if:    · You passed out (lost consciousness).     · You have severe trouble breathing.     · You have a very slow heartbeat (less than 60 beats a minute).     · You have a low body temperature (95°F or below). Call your doctor now or seek immediate medical care if:    · You feel tired, sluggish, or weak.     · You have trouble remembering things or concentrating.     · You do not begin to feel better 2 weeks after starting your medicine. Watch closely for changes in your health, and be sure to contact your doctor if you have any problems. Where can you learn more?   Go to http://www.gray.com/  Enter K293 in the search box to learn more about \"Hypothyroidism: Care Instructions. \"  Current as of: July 28, 2021               Content Version: 13.2  © 9155-7661 [employer]. Care instructions adapted under license by [employer] (which disclaims liability or warranty for this information). If you have questions about a medical condition or this instruction, always ask your healthcare professional. [place] [address] any warranty or liability for your use of this information.

## 2022-06-20 NOTE — PROGRESS NOTES
Subjective  Taylor Rebolledo is a 51 y.o. female presents for routine visit   HPI     Off thyroid medication for several months   She has weight gain and chronic insomnia   Constipation, has BM once a week. 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   No recent mammogram nor colonoscopy  S/p hysterectomy 2019  Hx of PE 9/27/19    Past Medical History:   Diagnosis Date    Anemia NEC     Asthma     HPV in female     Hypertension     Lung nodule < 6cm on CT 09/27/2019    Pulmonary embolism ([address] 09/27/2019    Thyroid disease      Past Surgical History:   Procedure Laterality Date    HX GYN  2/18/14    HYSTEROSCOPY Kimberly Madi AND CURETTAGE, BARTHOLIN MARSUPILIZATION    HX TUBAL LIGATION       Current Outpatient Medications   Medication Sig    levothyroxine (SYNTHROID) 88 mcg tablet Take 1 Tablet by mouth Daily (before breakfast).  albuterol (PROVENTIL HFA, VENTOLIN HFA, PROAIR HFA) 90 mcg/actuation inhaler Take 2 Puffs by inhalation every four (4) hours as needed for Wheezing. Please dispense with spacer (Patient not taking: Reported on 6/20/2022)    methocarbamoL (Robaxin) 500 mg tablet Take 1 Tablet by mouth four (4) times daily. (Patient not taking: Reported on 6/20/2022)     No current facility-administered medications for this visit. Allergies   Allergen Reactions    Shellfish Derived Hives       Review of Systems   Constitutional: Negative. Negative for malaise/fatigue. Eyes: Negative. Respiratory: Negative. Cardiovascular: Negative. Gastrointestinal: Positive for constipation. Genitourinary: Negative. Musculoskeletal: Negative. Neurological: Negative for dizziness and headaches. Psychiatric/Behavioral: Negative for depression. The patient has insomnia. The patient is not nervous/anxious.         Objective  Visit Vitals  /76 (BP 1 Location: Left upper arm, BP Patient Position: Sitting, BP Cuff Size: Adult)   Pulse 63   Temp 98.4 °F (36.9 °C) (Oral)   Resp 16 Ht 5' 7\" (1.702 m)   Wt 186 lb 6.4 oz (84.6 kg)   LMP 07/24/2019 (Exact Date)   SpO2 100%   BMI 29.19 kg/m²     Physical Exam  Constitutional:       Appearance: Normal appearance. HENT:      Head: Normocephalic and atraumatic. Cardiovascular:      Rate and Rhythm: Normal rate and regular rhythm. Pulses: Normal pulses. Heart sounds: Normal heart sounds. Abdominal:      General: Bowel sounds are normal. There is no distension. Palpations: Abdomen is soft. There is no mass. Tenderness: There is no abdominal tenderness. Hernia: No hernia is present. Musculoskeletal:      Cervical back: Normal range of motion and neck supple. Right lower leg: No edema. Left lower leg: No edema. Skin:     General: Skin is warm and dry. Neurological:      Mental Status: She is alert and oriented to person, place, and time. Mental status is at baseline. Psychiatric:         Mood and Affect: Mood normal.         Behavior: Behavior normal.         Thought Content: Thought content normal.          Assessment & Plan    ICD-10-CM ICD-9-CM    1. Postablative hypothyroidism  E89.0 244.1 TSH 3RD GENERATION      T4, FREE   2. Encounter for screening mammogram for malignant neoplasm of breast  Z12.31 V76.12 DICK 3D ABILIO W MAMMO BI SCREENING INCL CAD   3. Colon cancer screening  Z12.11 V76.51 REFERRAL TO GASTROENTEROLOGY      CBC WITH AUTOMATED DIFF   4. Chronic idiopathic constipation  K59.04 564.00    5. IFG (impaired fasting glucose)  R73.01 790.21 HEMOGLOBIN A1C WITH EAG   6. Hx pulmonary embolism  Z86.711 V12.55    7. Pure hypercholesterolemia  E78.00 272.0 LIPID PANEL      METABOLIC PANEL, COMPREHENSIVE     Diagnoses and all orders for this visit:    1. Postablative hypothyroidism  -     TSH 3RD GENERATION; Future  -     T4, FREE; Future    2. Encounter for screening mammogram for malignant neoplasm of breast  -     DICK 3D ABILIO W MAMMO BI SCREENING INCL CAD; Future    3.  Colon cancer screening  - REFERRAL TO GASTROENTEROLOGY  -     CBC WITH AUTOMATED DIFF; Future    4. Chronic idiopathic constipation    5. IFG (impaired fasting glucose)  -     HEMOGLOBIN A1C WITH EAG; Future    6. Hx pulmonary embolism    7. Pure hypercholesterolemia  -     LIPID PANEL; Future  -     METABOLIC PANEL, COMPREHENSIVE; Future    Other orders  -     levothyroxine (SYNTHROID) 88 mcg tablet; Take 1 Tablet by mouth Daily (before breakfast). Follow-up and Dispositions    · Return in about 3 months (around 9/20/2022) for thyroid disorder, fasting labs. Instructed to restart thyroid hormone replacement. Defer labs   Discussed current symptoms of hypothyroidism, need for adherence to medical management and follow up  Encouraged adequate hydration, increase physical activity, dietary fiber  lab results and schedule of future lab studies reviewed with patient  reviewed diet, exercise and weight control  cardiovascular risk and specific lipid/LDL goals reviewed  reviewed medications and side effects in detail      Patient voices understanding and acceptance of this advice and will call back if any further questions or concerns.   Rangel Her NP

## 2022-06-20 NOTE — PROGRESS NOTES
Rm 7    Chief Complaint   Patient presents with    Thyroid Problem     Pt has not had thyroid medication in a while     1. Have you been to the ER, urgent care clinic since your last visit? Hospitalized since your last visit? ED 5/30/33, Chest Pain, Abdominal pain, back pain    2. Have you seen or consulted any other health care providers outside of the [address] [place] [address] since your last visit? Include any pap smears or colon screening.  No    Health Maintenance Due   Topic Date Due    COVID-19 Vaccine (1) Never done    DTaP/Tdap/Td series (1 - Tdap) 08/07/2014    Colorectal Cancer Screening Combo  Never done     3 most recent PHQ Screens 6/20/2022   Little interest or pleasure in doing things Not at all   Feeling down, depressed, irritable, or hopeless Several days   Total Score PHQ 2 1